# Patient Record
Sex: FEMALE | Employment: FULL TIME | URBAN - METROPOLITAN AREA
[De-identification: names, ages, dates, MRNs, and addresses within clinical notes are randomized per-mention and may not be internally consistent; named-entity substitution may affect disease eponyms.]

---

## 2024-01-07 ENCOUNTER — TRANSFERRED RECORDS (OUTPATIENT)
Dept: HEALTH INFORMATION MANAGEMENT | Facility: CLINIC | Age: 29
End: 2024-01-07

## 2024-01-28 ENCOUNTER — TRANSFERRED RECORDS (OUTPATIENT)
Dept: HEALTH INFORMATION MANAGEMENT | Facility: CLINIC | Age: 29
End: 2024-01-28

## 2024-09-10 ENCOUNTER — DOCUMENTATION ONLY (OUTPATIENT)
Dept: MIDWIFE SERVICES | Facility: CLINIC | Age: 29
End: 2024-09-10

## 2024-09-10 DIAGNOSIS — D24.2 BILATERAL FIBROADENOMAS OF BREASTS: ICD-10-CM

## 2024-09-10 DIAGNOSIS — D24.1 BILATERAL FIBROADENOMAS OF BREASTS: ICD-10-CM

## 2024-09-10 DIAGNOSIS — Z85.43 HISTORY OF OVARIAN CANCER: ICD-10-CM

## 2024-09-10 DIAGNOSIS — Z34.02 ENCOUNTER FOR SUPERVISION OF NORMAL FIRST PREGNANCY IN SECOND TRIMESTER: Primary | ICD-10-CM

## 2024-09-10 DIAGNOSIS — Z11.3 ROUTINE SCREENING FOR STI (SEXUALLY TRANSMITTED INFECTION): ICD-10-CM

## 2024-09-10 DIAGNOSIS — Z86.2 HISTORY OF ANEMIA: ICD-10-CM

## 2024-09-10 DIAGNOSIS — R63.6 UNDERWEIGHT: ICD-10-CM

## 2024-09-10 DIAGNOSIS — Z90.13 HISTORY OF BILATERAL MASTECTOMY: ICD-10-CM

## 2024-09-10 DIAGNOSIS — Z12.4 CERVICAL CANCER SCREENING: ICD-10-CM

## 2024-09-11 VITALS
DIASTOLIC BLOOD PRESSURE: 62 MMHG | WEIGHT: 108 LBS | BODY MASS INDEX: 18.44 KG/M2 | HEIGHT: 64 IN | SYSTOLIC BLOOD PRESSURE: 90 MMHG

## 2024-09-11 PROBLEM — C50.912 BILATERAL MALIGNANT NEOPLASM OF BREAST IN FEMALE (H): Status: ACTIVE | Noted: 2021-01-01

## 2024-09-11 PROBLEM — Z80.3 FAMILY HISTORY OF BREAST CANCER: Status: ACTIVE | Noted: 2020-10-12

## 2024-09-11 PROBLEM — D24.1 BILATERAL FIBROADENOMAS OF BREASTS: Status: ACTIVE | Noted: 2020-12-21

## 2024-09-11 PROBLEM — C50.911 BILATERAL MALIGNANT NEOPLASM OF BREAST IN FEMALE (H): Status: RESOLVED | Noted: 2021-01-01 | Resolved: 2024-09-11

## 2024-09-11 PROBLEM — C50.911 BILATERAL MALIGNANT NEOPLASM OF BREAST IN FEMALE (H): Status: ACTIVE | Noted: 2021-01-01

## 2024-09-11 PROBLEM — Z90.13 HISTORY OF BILATERAL MASTECTOMY: Status: ACTIVE | Noted: 2021-02-01

## 2024-09-11 PROBLEM — N80.9 ENDOMETRIOSIS: Status: ACTIVE | Noted: 2020-08-31

## 2024-09-11 PROBLEM — C50.912 BILATERAL MALIGNANT NEOPLASM OF BREAST IN FEMALE (H): Status: RESOLVED | Noted: 2021-01-01 | Resolved: 2024-09-11

## 2024-09-11 PROBLEM — R63.6 UNDERWEIGHT: Status: ACTIVE | Noted: 2024-09-11

## 2024-09-11 PROBLEM — D24.2 BILATERAL FIBROADENOMAS OF BREASTS: Status: ACTIVE | Noted: 2020-12-21

## 2024-09-11 RX ORDER — ALBUTEROL SULFATE 90 UG/1
2 AEROSOL, METERED RESPIRATORY (INHALATION)
COMMUNITY

## 2024-09-11 NOTE — PROGRESS NOTES
Radha Graves is a NOB at [facility] for PNC at 15w2d based on US dating due to discrepancy in LMP.  Hx of 25-31 day cycles per pt report.      Establishing care for pregnancy that was not planned but happy to have conceived.  FOB is Seth.      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  Prophylactic Bilateral Mastectomy 2021.  Multiple fibroadenoma cysts of breast and elevated risk of Br CA with hx and family hx of MGM and MGGM breast Ca age 27 and 31.  Maternal mother hx is unknown to Radha as raised by [facility].      Underwt at 108#  Has not returned to pre surgical wt of 130 since surgery.  BMI 17.1 today.      Works as .   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      Hx of anemia per pt.       ASSESSMENT: 15w2d with hx as noted.    PLAN: Referral to [facility] for US based on hx for imaging of R ovary with fetal survey.  Genetic testing per pt desire.        Antwan ZAPATA, LUIS      Radha Graves is a 29 year old single  who is not a previous CNM patient.  She presents for a new OB Visit.         Patient's last menstrual period was 06/06/2024 (within days)..  Estimated Date of Delivery: Mar 3, 2025.  Estimated Date of Delivery: Mar 3, 2025  discrepancy with Patient's last menstrual period was 06/06/2024 (within days).  She has not had bleeding since her LMP.  This was not a planned pregnancy.   FOB is Seth who is in good health.  FOB IS actively involved in relationship with Radha Graves and this pregnancy.        Current medications are:    Current Outpatient Medications:     albuterol (PROAIR HFA/PROVENTIL HFA/VENTOLIN HFA) 108 (90 Base) MCG/ACT inhaler, Inhale 2 puffs into the lungs., Disp: , Rfl:      =========================================    INFECTION HISTORY  HIV: no  Hepatitis B: no  Hepatitis C: no  Tuberculosis: no  Last PPD   Herpes self: no  Herpes  partner:  no  Chlamydia:  no  Gonorrhea:  no  HPV: no  BV:  no  Trichomoniasis:  no  Syphilis:  no  Chicken Pox:  yes  ============================================    PERSONAL/SOCIAL HISTORY  Lives with partner.  Exercise Habits:  aerobic 4-7 days per week.  Employment: Full time.  Her job involves moderate activity with moderate potential for toxic exposure.  Travel plans are none planned. Additional items: Has been given information regarding WIC  =============================================    REVIEW OF SYSTEMS  CONSTITUTIONAL:  She denies fever, chills and viral infections since her last LMP.  There is no fatigue.  Weigh loss has not occurred..  DIET: Appetite is increase.  Eats a Regular diet.    Recommend to gain >35 pounds with her pregnancy.  SKIN: Denies changes and suspicious moles or lesions.  HEAD: No headache since LMP  denies dizziness and fainting.  ENT: Denies blurred vision, hearing loss, tinnitus, frequent colds, epistaxis, hoarseness and tooth pain.    ENDOCRINE: Denies heat and cold intolerance, and polydipsia.    HEART/LUNGS: Denies dyspnea, wheezing, coughing and chest pain.  Has experienced racing heart beat at times but of short duration and resolved.    HEMATOLOGIC: Denies tendency to bruise and history of blood clots.  GASTROINTESTINAL: Denies heartburn, indigestion and change of color in stools.  She has not had nausea..  Constipation has notbeen a problem since LMP.  She denies hemorrhoids.  Denies rectal bleeding.   GENITOURINARY:  Denies urgency, dysuria and hematuria.  Has frequency of urination since LMP.  She does not experience stress incontinence.  MUSCULOSKELETAL: Denies joint stiffness, pain and restricted motion.  NEUROLOGIC:  Denies seizures, weakness and fainting.  PSYCHIATRIC:  Denies mood changes  Has no previous psychiatric history or mental health treatment.  ===========================================    PHYSICAL EXAM  GENERAL:  29 year old pleasant pregnant female,  alert, cooperative and well groomed.  SKIN:  Warm and dry, without lesions or tenderness.    HEAD: Symmetrical features.  EYES:  PERRLA, wears no corective lenses.  EARS: Tympanic membranes gray, translucent and intact.  NOSE: No flaring, patent  MOUTH:  Buccal mucosa pink, moist without lesions.  Teeth in good repair.    NECK:  Thyroid without enlargement and nodules.  Lymph nodes not palpable.   LUNGS:  Clear to auscultation.  HEART:  RRR without murmur.  ABDOMEN: Soft without masses or tenderness.  No CVA tenderness.  Uterus palpable.  No scars noted..  MUSCULOSKELETAL:  Full range of motion.   Pelvis proven to -pelvis not tested.  EXTREMITIES:  2+/2+ DTR, No edema. No significant varicosities.  ===================================================    PLAN:  GC/Chlamydia screening: future  NOB Labs as appropriate for patient future     consult for US for hx of ovarian Ca.  Pap is indicated.  Instructed on use of triage nurse line and contacting the on call CNM after hours in an emergency.      Genetic Screening testing reviewed:  Multiple Marker Screening (QUAD Test) :  which is performed between 15 and 20 wks and combines the patients age, and measurement of MSAFP, hCG, uE3 and Inhibin A from the maternal serum.  This screen detects 70% of Down Syndrome and 60% of Trisomy 18 infants with a 5% screen positive rate.   Patient will have QUAD screen drawn: No - .    Genetic Ultrasound which will be performed at 18-20 wks gestation will discover aneuploid markers in 60% of fetuses with Down Syndrome with 40% appearing normal by US.    Discussed the risks, benefits, side effects and alternative therapies for current prescribed and OTC medications.    Will return to the clinic in 4 weeks for her next routine prenatal check.  Will call to be seen sooner if problems arise.    Antwan ZAPATA, LUIS

## 2024-09-12 ENCOUNTER — TRANSCRIBE ORDERS (OUTPATIENT)
Dept: MATERNAL FETAL MEDICINE | Facility: HOSPITAL | Age: 29
End: 2024-09-12

## 2024-09-12 DIAGNOSIS — Z85.43 HISTORY OF OVARIAN CANCER: ICD-10-CM

## 2024-09-12 DIAGNOSIS — O26.90 PREGNANCY RELATED CONDITION, ANTEPARTUM: Primary | ICD-10-CM

## 2024-09-12 DIAGNOSIS — Z34.02 ENCOUNTER FOR SUPERVISION OF NORMAL FIRST PREGNANCY IN SECOND TRIMESTER: Primary | ICD-10-CM

## 2024-09-12 DIAGNOSIS — Z80.3 FAMILY HISTORY OF BREAST CANCER: ICD-10-CM

## 2024-09-30 ENCOUNTER — PRE VISIT (OUTPATIENT)
Dept: MATERNAL FETAL MEDICINE | Facility: HOSPITAL | Age: 29
End: 2024-09-30

## 2024-10-03 ENCOUNTER — ANCILLARY PROCEDURE (OUTPATIENT)
Dept: ULTRASOUND IMAGING | Facility: HOSPITAL | Age: 29
End: 2024-10-03
Attending: OBSTETRICS & GYNECOLOGY
Payer: COMMERCIAL

## 2024-10-03 ENCOUNTER — OFFICE VISIT (OUTPATIENT)
Dept: MATERNAL FETAL MEDICINE | Facility: HOSPITAL | Age: 29
End: 2024-10-03
Attending: OBSTETRICS & GYNECOLOGY
Payer: COMMERCIAL

## 2024-10-03 DIAGNOSIS — O26.90 PREGNANCY RELATED CONDITION, ANTEPARTUM: ICD-10-CM

## 2024-10-03 DIAGNOSIS — O43.112 CIRCUMVALLATE PLACENTA IN SECOND TRIMESTER: Primary | ICD-10-CM

## 2024-10-03 DIAGNOSIS — O26.92 PREGNANCY RELATED CONDITION IN SECOND TRIMESTER: ICD-10-CM

## 2024-10-03 DIAGNOSIS — Z31.5 ENCOUNTER FOR PROCREATIVE GENETIC COUNSELING AND TESTING: Primary | ICD-10-CM

## 2024-10-03 PROCEDURE — 76811 OB US DETAILED SNGL FETUS: CPT

## 2024-10-03 PROCEDURE — 99212 OFFICE O/P EST SF 10 MIN: CPT | Performed by: OBSTETRICS & GYNECOLOGY

## 2024-10-03 PROCEDURE — 99203 OFFICE O/P NEW LOW 30 MIN: CPT | Mod: 25 | Performed by: OBSTETRICS & GYNECOLOGY

## 2024-10-03 PROCEDURE — 76811 OB US DETAILED SNGL FETUS: CPT | Mod: 26 | Performed by: OBSTETRICS & GYNECOLOGY

## 2024-10-03 PROCEDURE — 96040 HC GENETIC COUNSELING, EACH 30 MINUTES: CPT | Performed by: GENETIC COUNSELOR, MS

## 2024-10-03 NOTE — PROGRESS NOTES
The patient was seen for an ultrasound in the Maternal-Fetal Medicine Center at the [facility] today.  For a detailed report of the ultrasound examination, please see the ultrasound report which can be found under the imaging tab.    If you have questions regarding today's evaluation or if we can be of further service, please contact the Maternal-Fetal Medicine Center.    Samantha Chambers MD  , OB/GYN  Maternal-Fetal Medicine  [phone] (Pager)

## 2024-10-03 NOTE — NURSING NOTE
Radha DUCKWORTH Star is a  at 18w3d who presents to [facility] for scheduled genetic counseling and L2.SBAR given to Dr. Chambers, see note in Epic.

## 2024-10-03 NOTE — PROGRESS NOTES
[facility]  Genetic Counseling Consult    Patient:  Radha Graves  Preferred Name: Radha YOB: 1995   Date of Service:  10/03/24   MRN: [id number]    Radha was seen at [facility] for genetic consultation. The indication for genetic counseling is desire to discuss options for genetic screening and diagnostics. The patient was unaccompanied to this visit, however her partner Seth was present via speakerphone for a majority of the appointment.     The session was conducted in English.      IMPRESSION/ PLAN   1. Radha has not had genetic screening in this pregnancy and declines options discussed today.    2. During today's [facility] visit, Radha had a genetic counseling session only. Screening and diagnostic testing was discussed and declined.     3. Radha had a level II comprehensive anatomy ultrasound today. Please see the ultrasound report for further details.      PREGNANCY HISTORY   /Parity:       Radha's pregnancy history is significant for:   Multiple miscarriages that had not been previously documented.  We discussed that 2 early losses would most likely be due to random/sporadic loss, however an evaluation for recurrent pregnancy loss would be indicated in the event of any additional pregnancy loss in the future.     CURRENT PREGNANCY   Current Age: 29 year old   Age at Delivery: 29 year old  DODIE: 3/3/2025, by Ultrasound                                   Gestational Age: 18w3d  This pregnancy is a single gestation.   This pregnancy was conceived spontaneously.  Radha reports the following complications and/or exposure concerns in this pregnancy: NONE    MEDICAL HISTORY   Radha s reported medical history includes a diagnosis of ovarian cancer at 15 treated with unilateral oophorectomy and bilateral fibrous adenoma in  treated with bilateral mastectomy.  Radha's family history  "is significant for early onset breast and ovarian cancer, and while Radha has had a negative hereditary cancer panel in the past, we discussed that further care with cancer-specific genetic counseling may be appropriate in the future.         FAMILY HISTORY   A three-generation pedigree was obtained today and is scanned under the \"Media\" tab in Epic. The family history was reported by Radha and their partner.    The following significant findings were reported today:   Radha had ovarian cancer at 15, and bilateral fibroadenemos in her 20's. Radha's mother had ovarianc cancer and breast cancer <50.  Her maternal grandmother had breast cancer <30.  We discussed the strong indication for a hereditary cancer syndrome in her family history. Radha's records indicate a negative 84 gene hereditary cancer panel several years ago, however we discussed that follow up with cancer-genetics in the coming years to assess for any new advances in testing would be appropriate.    Seth reports having G6PD deficiency. This condition affects how the body turns food into energy. It can cause hemolytic anemia. Jaundice is often the first symptoms of the condition. This condition can be variable and has environmental triggers that exacerbate the symptoms (such as josé beans). Some affected individuals only have mild symptoms and others may be asymptomatic. Follow up discussion with a pediatrician would be recommended for this history. Females are typically much less significantly affected than males.      Otherwise, the reported family history is unremarkable for multiple miscarriages, stillbirths, birth defects, intellectual disabilities, known genetic conditions, and consanguinity.       RISK ASSESSMENT FOR INHERITED CONDITIONS AND CARRIER SCREENING OPTIONS   Expanded carrier screening is available to screen for autosomal recessive conditions and X-linked conditions in a large list of genes. Carrier screening does not test " the pregnancy but gives a risk assessment for the pregnancy and future pregnancies to have the condition. Expanded carrier screening is designed to identify carrier status for conditions that are primarily childhood or adolescent onset. Expanded carrier screening does not evaluate for adult-onset conditions such as hereditary cancer syndromes, dementia/ Alzheimer's disease, or cardiovascular disease risk factors. Additionally, expanded carrier screening is not comprehensive for all known genetic diseases or inherited conditions. Carrier screening does not test for all genetic and health conditions or risk factors.     Autosomal recessive conditions happen when a mutation has been inherited from the egg and sperm and include conditions like cystic fibrosis, thalassemia, hearing loss, spinal muscular atrophy, and more. We reviewed that when both biological parents carry a harmful genetic change in a gene associated with autosomal recessive inheritance, each of their pregnancies has a 1 in 4 (25%) chance to be affected by that condition. X-linked conditions happen when a mutation has been inherited from the egg and include conditions like fragile X syndrome.With x-linked conditions, the specific risk generally depends on the chromosomal sex of the fetus, with XY individuals (generally male) being most severely affected.      screening was reviewed. About [place] Screening    The patient has not had carrier screening previously.     The patient declined the carrier screening options and declined a thorough discussion of the options. They are aware the option will remain, and they can contact us if they would like to pursue screening. See below for the more detailed information we dicussed.      RISK ASSESSMENT FOR CHROMOSOME CONDITIONS   We explained that the risk for fetal chromosome abnormalities increases with maternal age. We discussed specific features of common chromosome abnormalities, including Down  syndrome, trisomy 13, trisomy 18, and sex chromosome trisomies.    At age 29 at midtrimester, the risk to have a baby with Down syndrome is 1 in 855.  At age 29 at midtrimester, the risk to have a baby with any chromosome abnormality is 1 in 428.     Radha has not had genetic screening in this pregnancy and declines options discussed today.     GENETIC TESTING OPTIONS   Genetic testing during a pregnancy includes screening and diagnostic procedures.      Screening tests are non-invasive which means no risk to the pregnancy and includes ultrasounds and blood work. The benefits and limitations of screening were reviewed. Screening tests provide a risk assessment (chance) specific to the pregnancy for certain fetal chromosome abnormalities but cannot definitively diagnose or exclude a fetal chromosome abnormality. Follow-up genetic counseling and consideration of diagnostic testing is recommended with any abnormal screening result. Diagnostic testing during a pregnancy is more certain and can test for more conditions. However, the tests do have a risk of miscarriage that requires careful consideration. These tests can detect fetal chromosome abnormalities with greater than 99% certainty. Results can be compromised by maternal cell contamination or mosaicism and are limited by the resolution of current genetic testing technology.     There is no screening or diagnostic test that detects all forms of birth defects or intellectual disability.     We discussed the following screening options:     Non-invasive prenatal testing (NIPT)  Also called cell-free DNA screening because it detects chromosomes from the placenta in the pregnant person's blood  Can be done any time after 10 weeks gestation  Standard recommendation for NIPT screens for trisomy 21, trisomy 18, trisomy 13, with the option of adding sex chromosome aneuploidies, without or without predicted sex  Cannot screen for open neural tube defects, maternal serum  AFP after 15 weeks is recommended  New NIPT options include screening for other trisomies, microdeletion syndromes, and in some cases fetal blood antigens. Guidelines do not recommend these conditions are included in standard screening. These options have limitations and should be discussed with a genetic counselor.     We discussed the following ultrasound options:  Comprehensive level II ultrasound (Fetal Anatomy Ultrasound)  Ultrasound done between 18-20 weeks gestation  Screens for major birth defects and markers for aneuploidy (like trisomy 21 and trisomy 18)  Includes looking at the fetus/baby's growth, heart, organs (stomach, kidneys), placenta, and amniotic fluid    We discussed the following diagnostic options:     Amniocentesis  Invasive diagnostic procedure done after 15 weeks gestation  The procedure collects a small sample of amniotic fluid for the purpose of chromosomal testing and/or other genetic testing  Diagnostic result; more than 99% sensitivity for fetal chromosome abnormalities  Testing for AFP in the amniotic fluid can test for open neural tube defects    It was a pleasure to be involved with Radha s care. Face-to-face time of the meeting was 30 minutes.    Tuan Jennings, JUANJOSE, MS, [facility]  Board Certified and [place] Licensed Genetic Counselor   [facility]  Maternal Fetal Medicine  Office: [phone]  [facility]: [phone]   Fax: [phone]  [facility]

## 2024-10-06 ENCOUNTER — HEALTH MAINTENANCE LETTER (OUTPATIENT)
Age: 29
End: 2024-10-06

## 2024-10-07 LAB
ABO/RH(D): NORMAL
ANTIBODY SCREEN: NEGATIVE
SPECIMEN EXPIRATION DATE: NORMAL

## 2024-10-08 ENCOUNTER — TRANSFERRED RECORDS (OUTPATIENT)
Dept: HEALTH INFORMATION MANAGEMENT | Facility: CLINIC | Age: 29
End: 2024-10-08

## 2024-10-08 ENCOUNTER — DOCUMENTATION ONLY (OUTPATIENT)
Dept: MIDWIFE SERVICES | Facility: CLINIC | Age: 29
End: 2024-10-08
Payer: COMMERCIAL

## 2024-10-08 DIAGNOSIS — Z11.3 ROUTINE SCREENING FOR STI (SEXUALLY TRANSMITTED INFECTION): ICD-10-CM

## 2024-10-08 DIAGNOSIS — Z12.4 CERVICAL CANCER SCREENING: ICD-10-CM

## 2024-10-08 DIAGNOSIS — Z86.2 HISTORY OF ANEMIA: ICD-10-CM

## 2024-10-08 DIAGNOSIS — Z34.02 ENCOUNTER FOR SUPERVISION OF NORMAL FIRST PREGNANCY IN SECOND TRIMESTER: Primary | ICD-10-CM

## 2024-10-08 DIAGNOSIS — D50.8 IRON DEFICIENCY ANEMIA SECONDARY TO INADEQUATE DIETARY IRON INTAKE: ICD-10-CM

## 2024-10-08 LAB
ERYTHROCYTE [DISTWIDTH] IN BLOOD BY AUTOMATED COUNT: 15.9 % (ref 10–15)
EST. AVERAGE GLUCOSE BLD GHB EST-MCNC: 100 MG/DL
FERRITIN SERPL-MCNC: 21 NG/ML (ref 6–175)
HBA1C MFR BLD: 5.1 %
HCT VFR BLD AUTO: 33.2 % (ref 35–47)
HGB BLD-MCNC: 10.5 G/DL (ref 11.7–15.7)
IRON BINDING CAPACITY (ROCHE): 441 UG/DL (ref 240–430)
IRON SATN MFR SERPL: 10 % (ref 15–46)
IRON SERPL-MCNC: 42 UG/DL (ref 37–145)
MCH RBC QN AUTO: 26.7 PG (ref 26.5–33)
MCHC RBC AUTO-ENTMCNC: 31.6 G/DL (ref 31.5–36.5)
MCV RBC AUTO: 85 FL (ref 78–100)
PLATELET # BLD AUTO: 186 10E3/UL (ref 150–450)
RBC # BLD AUTO: 3.93 10E6/UL (ref 3.8–5.2)
RUBV IGG SERPL QL IA: 5.48 INDEX
RUBV IGG SERPL QL IA: POSITIVE
T PALLIDUM AB SER QL: NONREACTIVE
TSH SERPL DL<=0.005 MIU/L-ACNC: 1.04 UIU/ML (ref 0.3–4.2)
WBC # BLD AUTO: 9.1 10E3/UL (ref 4–11)

## 2024-10-08 PROCEDURE — 83550 IRON BINDING TEST: CPT | Performed by: ADVANCED PRACTICE MIDWIFE

## 2024-10-08 PROCEDURE — 2894A CBC WITH PLATELETS: CPT | Performed by: ADVANCED PRACTICE MIDWIFE

## 2024-10-08 PROCEDURE — 86780 TREPONEMA PALLIDUM: CPT | Performed by: ADVANCED PRACTICE MIDWIFE

## 2024-10-08 PROCEDURE — 2894A RUBELLA ANTIBODY IGG: CPT | Performed by: ADVANCED PRACTICE MIDWIFE

## 2024-10-08 PROCEDURE — 87591 N.GONORRHOEAE DNA AMP PROB: CPT | Performed by: ADVANCED PRACTICE MIDWIFE

## 2024-10-08 PROCEDURE — 87389 HIV-1 AG W/HIV-1&-2 AB AG IA: CPT | Performed by: ADVANCED PRACTICE MIDWIFE

## 2024-10-08 PROCEDURE — 84443 ASSAY THYROID STIM HORMONE: CPT | Performed by: ADVANCED PRACTICE MIDWIFE

## 2024-10-08 PROCEDURE — 82728 ASSAY OF FERRITIN: CPT | Performed by: ADVANCED PRACTICE MIDWIFE

## 2024-10-08 PROCEDURE — 87491 CHLMYD TRACH DNA AMP PROBE: CPT | Performed by: ADVANCED PRACTICE MIDWIFE

## 2024-10-08 PROCEDURE — 2894A TYPE AND SCREEN, ADULT: CPT | Performed by: ADVANCED PRACTICE MIDWIFE

## 2024-10-08 PROCEDURE — 83540 ASSAY OF IRON: CPT | Performed by: ADVANCED PRACTICE MIDWIFE

## 2024-10-08 PROCEDURE — 83036 HEMOGLOBIN GLYCOSYLATED A1C: CPT | Performed by: ADVANCED PRACTICE MIDWIFE

## 2024-10-08 PROCEDURE — 86803 HEPATITIS C AB TEST: CPT | Performed by: ADVANCED PRACTICE MIDWIFE

## 2024-10-08 PROCEDURE — G0145 SCR C/V CYTO,THINLAYER,RESCR: HCPCS | Performed by: ADVANCED PRACTICE MIDWIFE

## 2024-10-08 PROCEDURE — 2894A HEPATITIS B SURFACE ANTIGEN: CPT | Performed by: ADVANCED PRACTICE MIDWIFE

## 2024-10-08 NOTE — PROGRESS NOTES
Radha Graves is here at [facility] for Prenatal Care.  Documentation noted in media tab.  Antwan Swan APRN, CNM  [facility] Staff CNM

## 2024-10-09 LAB
C TRACH DNA SPEC QL NAA+PROBE: NEGATIVE
HBV SURFACE AG SERPL QL IA: NONREACTIVE
HCV AB SERPL QL IA: NONREACTIVE
HIV 1+2 AB+HIV1 P24 AG SERPL QL IA: NONREACTIVE
N GONORRHOEA DNA SPEC QL NAA+PROBE: NEGATIVE

## 2024-10-09 RX ORDER — MULTIVIT WITH MINERALS/LUTEIN
250 TABLET ORAL DAILY
Qty: 100 TABLET | Refills: 1 | Status: SHIPPED | OUTPATIENT
Start: 2024-10-09

## 2024-10-09 RX ORDER — FERROUS GLUCONATE 324(38)MG
324 TABLET ORAL
Qty: 100 TABLET | Refills: 3 | Status: SHIPPED | OUTPATIENT
Start: 2024-10-09

## 2024-10-09 RX ORDER — LANOLIN ALCOHOL/MO/W.PET/CERES
1000 CREAM (GRAM) TOPICAL DAILY
Qty: 100 TABLET | Refills: 1 | Status: SHIPPED | OUTPATIENT
Start: 2024-10-09

## 2024-10-11 LAB
BKR LAB AP GYN ADEQUACY: NORMAL
BKR LAB AP GYN INTERPRETATION: NORMAL
BKR LAB AP HPV REFLEX: NORMAL
BKR LAB AP LMP: NORMAL
BKR LAB AP PREVIOUS ABNORMAL: NORMAL
PATH REPORT.COMMENTS IMP SPEC: NORMAL
PATH REPORT.COMMENTS IMP SPEC: NORMAL
PATH REPORT.RELEVANT HX SPEC: NORMAL

## 2024-11-12 ENCOUNTER — DOCUMENTATION ONLY (OUTPATIENT)
Dept: MIDWIFE SERVICES | Facility: CLINIC | Age: 29
End: 2024-11-12
Payer: COMMERCIAL

## 2024-11-12 DIAGNOSIS — Z34.02 ENCOUNTER FOR SUPERVISION OF NORMAL FIRST PREGNANCY IN SECOND TRIMESTER: Primary | ICD-10-CM

## 2024-11-12 LAB
GLUCOSE 1H P 50 G GLC PO SERPL-MCNC: 125 MG/DL (ref 70–129)
HGB BLD-MCNC: 11.2 G/DL (ref 11.7–15.7)

## 2024-11-12 PROCEDURE — 82950 GLUCOSE TEST: CPT | Performed by: ADVANCED PRACTICE MIDWIFE

## 2024-11-12 PROCEDURE — 36415 COLL VENOUS BLD VENIPUNCTURE: CPT | Performed by: ADVANCED PRACTICE MIDWIFE

## 2024-11-12 NOTE — PROGRESS NOTES
Radha Graves is here at [facility] for PNC with documentation under the media tab for details of visit.  Antwan Swan APRN, CNM

## 2024-12-12 ENCOUNTER — OFFICE VISIT (OUTPATIENT)
Dept: MATERNAL FETAL MEDICINE | Facility: HOSPITAL | Age: 29
End: 2024-12-12
Attending: STUDENT IN AN ORGANIZED HEALTH CARE EDUCATION/TRAINING PROGRAM
Payer: COMMERCIAL

## 2024-12-12 ENCOUNTER — ANCILLARY PROCEDURE (OUTPATIENT)
Dept: ULTRASOUND IMAGING | Facility: HOSPITAL | Age: 29
End: 2024-12-12
Attending: STUDENT IN AN ORGANIZED HEALTH CARE EDUCATION/TRAINING PROGRAM
Payer: COMMERCIAL

## 2024-12-12 DIAGNOSIS — Z34.02 ENCOUNTER FOR SUPERVISION OF NORMAL FIRST PREGNANCY IN SECOND TRIMESTER: Primary | ICD-10-CM

## 2024-12-12 DIAGNOSIS — O43.112 CIRCUMVALLATE PLACENTA IN SECOND TRIMESTER: ICD-10-CM

## 2024-12-12 PROCEDURE — 99213 OFFICE O/P EST LOW 20 MIN: CPT | Mod: 25 | Performed by: STUDENT IN AN ORGANIZED HEALTH CARE EDUCATION/TRAINING PROGRAM

## 2024-12-12 PROCEDURE — 76816 OB US FOLLOW-UP PER FETUS: CPT

## 2024-12-12 PROCEDURE — 76816 OB US FOLLOW-UP PER FETUS: CPT | Mod: 26 | Performed by: STUDENT IN AN ORGANIZED HEALTH CARE EDUCATION/TRAINING PROGRAM

## 2024-12-12 NOTE — NURSING NOTE
Radha Graves is a  at 28w3d who presents to [facility] for scheduled growth ultrasound. Pt reports positive fetal movement. Pt denies bldg/lof/change in discharge, contractions, vision changes, chest pain/SOB or edema. Pt does endorse HEADACHES, which she is addressing with her primary clinic, likely related to her anemia. SBAR given to Dr. DARIAN Tate, see note in Epic.

## 2024-12-12 NOTE — PROGRESS NOTES
The patient was seen for an ultrasound in the [facility] today.  For a detailed report of the ultrasound examination, please see the ultrasound report which can be found under the imaging tab.     If you have questions regarding today's evaluation or if we can be of further service, please contact the Maternal-Fetal Medicine Center.    Chantal Tate MD  Maternal Fetal Medicine

## 2024-12-17 ENCOUNTER — DOCUMENTATION ONLY (OUTPATIENT)
Dept: MIDWIFE SERVICES | Facility: CLINIC | Age: 29
End: 2024-12-17
Payer: COMMERCIAL

## 2024-12-17 DIAGNOSIS — G43.001 MIGRAINE WITHOUT AURA AND WITH STATUS MIGRAINOSUS, NOT INTRACTABLE: Primary | ICD-10-CM

## 2024-12-17 DIAGNOSIS — K21.00 GASTROESOPHAGEAL REFLUX DISEASE WITH ESOPHAGITIS WITHOUT HEMORRHAGE: ICD-10-CM

## 2024-12-17 DIAGNOSIS — Z34.02 ENCOUNTER FOR SUPERVISION OF NORMAL FIRST PREGNANCY IN SECOND TRIMESTER: ICD-10-CM

## 2024-12-17 RX ORDER — MAGNESIUM OXIDE 400 MG/1
400 TABLET ORAL AT BEDTIME
Qty: 60 TABLET | Refills: 0 | Status: SHIPPED | OUTPATIENT
Start: 2024-12-17

## 2024-12-17 RX ORDER — METOCLOPRAMIDE 10 MG/1
10 TABLET ORAL 4 TIMES DAILY PRN
Qty: 30 TABLET | Refills: 0 | Status: SHIPPED | OUTPATIENT
Start: 2024-12-17

## 2024-12-17 RX ORDER — DIPHENHYDRAMINE HCL 25 MG
50 TABLET ORAL EVERY 6 HOURS PRN
Qty: 30 TABLET | Refills: 1 | Status: SHIPPED | OUTPATIENT
Start: 2024-12-17

## 2024-12-20 ENCOUNTER — TELEPHONE (OUTPATIENT)
Dept: MIDWIFE SERVICES | Facility: CLINIC | Age: 29
End: 2024-12-20
Payer: COMMERCIAL

## 2024-12-20 NOTE — TELEPHONE ENCOUNTER
[facility] Prior Authorization Team Request    Medication:  omeprazole (PRILOSEC) 20 MG DR capsule   Dosinmg daily  NDC (required for Medicaid members):   Insurance Company:     Primary Visit Coverage    Payer Plan Sponsor Code Group Number Group Name   MEDICA MEDICA VANTAGE PLUS SELF INSURED [id number]      Primary Visit Coverage Subscriber    ID Name [facility] Address   [id number]        CoverMyMeds Key (if applicable):   [id number]    Additional documentation:   Pharmacy Filling the Rx:    [facility]  [address] [place] [address]    Filling Pharmacy Phone:    [phone]   Contact:  PHARM [employer]IVERS[employer] PA (P [phone]) please send all responses to this pool.  Pharmacy NPI (required for Medicaid members):    Nazanin Lyons RN on 2024 at 2:12 PM

## 2024-12-23 NOTE — TELEPHONE ENCOUNTER
Retail Pharmacy Prior Authorization Team   Phone: [phone]    PRIOR AUTHORIZATION DENIED    Medication: OMEPRAZOLE 20 MG PO CPDR  Insurance Company: [employer] Non-Specialty PA's - Phone [phone] Fax [phone]  Denial Date: 12/23/2024  Denial Reason(s):     Drug is excluded for patient's ages 13 years and older. No PA or appeal available for review.         Appeal Information: N/A  Patient Notified: No

## 2025-01-23 ENCOUNTER — OFFICE VISIT (OUTPATIENT)
Dept: MATERNAL FETAL MEDICINE | Facility: HOSPITAL | Age: 30
End: 2025-01-23
Attending: STUDENT IN AN ORGANIZED HEALTH CARE EDUCATION/TRAINING PROGRAM
Payer: COMMERCIAL

## 2025-01-23 ENCOUNTER — ANCILLARY PROCEDURE (OUTPATIENT)
Dept: ULTRASOUND IMAGING | Facility: HOSPITAL | Age: 30
End: 2025-01-23
Attending: STUDENT IN AN ORGANIZED HEALTH CARE EDUCATION/TRAINING PROGRAM
Payer: COMMERCIAL

## 2025-01-23 DIAGNOSIS — O43.103 PLACENTAL ABNORMALITY IN THIRD TRIMESTER: Primary | ICD-10-CM

## 2025-01-23 DIAGNOSIS — O43.112 CIRCUMVALLATE PLACENTA IN SECOND TRIMESTER: ICD-10-CM

## 2025-01-23 PROCEDURE — 76816 OB US FOLLOW-UP PER FETUS: CPT | Mod: 26 | Performed by: STUDENT IN AN ORGANIZED HEALTH CARE EDUCATION/TRAINING PROGRAM

## 2025-01-23 PROCEDURE — 76816 OB US FOLLOW-UP PER FETUS: CPT

## 2025-01-23 NOTE — PROGRESS NOTES
Radha Graves is a  at 34w3d who presents to [facility] for follow up growth ultrasound. Pt reports positive fetal movement. Pt denies bldg/lof/change in discharge, contractions, headache, vision changes, chest pain/SOB or edema. SBAR given to Dr. Wright, see note in Epic.

## 2025-01-23 NOTE — PROGRESS NOTES
The patient was seen for an ultrasound in the Maternal-Fetal Medicine Center clinic today.  For a detailed report of the ultrasound examination, please see the ultrasound report which can be found under the imaging tab.    If you have questions regarding today's evaluation or if we can be of further service, please contact the Maternal-Fetal Medicine Center.    Marya Wright M.D.  Maternal Fetal-Medicine Specialist

## 2025-01-28 ENCOUNTER — DOCUMENTATION ONLY (OUTPATIENT)
Dept: MIDWIFE SERVICES | Facility: CLINIC | Age: 30
End: 2025-01-28
Payer: COMMERCIAL

## 2025-01-28 DIAGNOSIS — Z23 NEED FOR TDAP VACCINATION: ICD-10-CM

## 2025-01-28 DIAGNOSIS — Z34.03 ENCOUNTER FOR SUPERVISION OF NORMAL FIRST PREGNANCY IN THIRD TRIMESTER: Primary | ICD-10-CM

## 2025-01-28 PROCEDURE — 87653 STREP B DNA AMP PROBE: CPT | Performed by: ADVANCED PRACTICE MIDWIFE

## 2025-01-28 PROCEDURE — 90715 TDAP VACCINE 7 YRS/> IM: CPT | Performed by: ADVANCED PRACTICE MIDWIFE

## 2025-01-28 PROCEDURE — 90471 IMMUNIZATION ADMIN: CPT | Performed by: ADVANCED PRACTICE MIDWIFE

## 2025-01-28 NOTE — PROGRESS NOTES
Radha A Colon I 35w1d and had PNC at [facility] today.  Documentation in Media tab.  Antwan Swan APRN, CNM

## 2025-01-29 LAB — GP B STREP DNA SPEC QL NAA+PROBE: NEGATIVE

## 2025-02-07 ENCOUNTER — HOSPITAL ENCOUNTER (OUTPATIENT)
Facility: CLINIC | Age: 30
End: 2025-02-07
Admitting: ADVANCED PRACTICE MIDWIFE
Payer: COMMERCIAL

## 2025-02-07 ENCOUNTER — HOSPITAL ENCOUNTER (OUTPATIENT)
Facility: CLINIC | Age: 30
Discharge: HOME | End: 2025-02-07
Attending: OBSTETRICS & GYNECOLOGY | Admitting: ADVANCED PRACTICE MIDWIFE
Payer: COMMERCIAL

## 2025-02-07 VITALS — DIASTOLIC BLOOD PRESSURE: 63 MMHG | SYSTOLIC BLOOD PRESSURE: 97 MMHG

## 2025-02-07 DIAGNOSIS — Z36.89 ENCOUNTER FOR TRIAGE IN PREGNANT PATIENT: Primary | ICD-10-CM

## 2025-02-07 DIAGNOSIS — N76.0 BV (BACTERIAL VAGINOSIS): ICD-10-CM

## 2025-02-07 DIAGNOSIS — B96.89 BV (BACTERIAL VAGINOSIS): ICD-10-CM

## 2025-02-07 DIAGNOSIS — Z34.03 ENCOUNTER FOR SUPERVISION OF NORMAL FIRST PREGNANCY IN THIRD TRIMESTER: ICD-10-CM

## 2025-02-07 LAB
ALBUMIN UR-MCNC: NEGATIVE MG/DL
APPEARANCE UR: CLEAR
BILIRUB UR QL STRIP: NEGATIVE
CLUE CELLS: PRESENT
COLOR UR AUTO: ABNORMAL
GLUCOSE UR STRIP-MCNC: NEGATIVE MG/DL
HGB UR QL STRIP: NEGATIVE
KETONES UR STRIP-MCNC: NEGATIVE MG/DL
LEUKOCYTE ESTERASE UR QL STRIP: NEGATIVE
MUCOUS THREADS #/AREA URNS LPF: PRESENT /LPF
NITRATE UR QL: NEGATIVE
PH UR STRIP: 6.5 [PH] (ref 5–7)
RBC URINE: <1 /HPF
RUPTURE OF FETAL MEMBRANES BY ROM PLUS: NEGATIVE
SP GR UR STRIP: 1.01 (ref 1–1.03)
SQUAMOUS EPITHELIAL: <1 /HPF
TRICHOMONAS, WET PREP: ABNORMAL
UROBILINOGEN UR STRIP-MCNC: <2 MG/DL
WBC URINE: 1 /HPF
WBC'S/HIGH POWER FIELD, WET PREP: ABNORMAL
YEAST, WET PREP: ABNORMAL

## 2025-02-07 PROCEDURE — 87210 SMEAR WET MOUNT SALINE/INK: CPT | Performed by: ADVANCED PRACTICE MIDWIFE

## 2025-02-07 PROCEDURE — 81001 URINALYSIS AUTO W/SCOPE: CPT | Performed by: ADVANCED PRACTICE MIDWIFE

## 2025-02-07 PROCEDURE — G0463 HOSPITAL OUTPT CLINIC VISIT: HCPCS

## 2025-02-07 PROCEDURE — 99213 OFFICE O/P EST LOW 20 MIN: CPT | Performed by: ADVANCED PRACTICE MIDWIFE

## 2025-02-07 PROCEDURE — 84112 EVAL AMNIOTIC FLUID PROTEIN: CPT | Performed by: ADVANCED PRACTICE MIDWIFE

## 2025-02-07 RX ORDER — LIDOCAINE 40 MG/G
CREAM TOPICAL
Status: DISCONTINUED | OUTPATIENT
Start: 2025-02-07 | End: 2025-02-07 | Stop reason: HOSPADM

## 2025-02-07 RX ORDER — METRONIDAZOLE 500 MG/1
500 TABLET ORAL 2 TIMES DAILY
Qty: 14 TABLET | Refills: 0 | Status: SHIPPED | OUTPATIENT
Start: 2025-02-07 | End: 2025-02-14

## 2025-02-07 ASSESSMENT — ACTIVITIES OF DAILY LIVING (ADL)
ADLS_ACUITY_SCORE: 18
ADLS_ACUITY_SCORE: 18

## 2025-02-07 NOTE — DISCHARGE INSTRUCTIONS
EARLY LABOR DISCHARGE INSTRUCTIONS        Refer to Any Day Now handout for tips on how to labor at home    WHEN TO CALL YOUR PROVIDER:  If this is your first baby: Your contractions (tightening) are 5 minutes apart, last more than 1 minute, and have been consistently getting stronger for 1 hour or more  If this is your second baby or beyond: Your contractions are less than 10 minutes apart and have been consistently getting stronger for 1 hour or more  Feeling your baby move less than usual  Temperature of 100.4 F (38 C) or higher  New fluid leaking from your vagina  Other signs your provider asked you to look for in your body  Vaginal bleeding (bright red blood)  Swelling in your face or more swelling in your hands or legs  Headaches that don't get better after taking Tylenol (acetaminophen)  Changes in your vision (blurry or seeing spots or stars)  Nausea (sick to your stomach) and vomiting (throwing up)  Heartburn that doesn't go away  Sudden, bad belly pain that is unlike your contractions      FOLLOW UP:  As scheduled in the clinic

## 2025-02-07 NOTE — PROGRESS NOTES
Outpatient/Triage Note:    Patient Name:  Radha Graves  :      1995  MRN:      [id number]      Assessment:   @ 36w4d here for evaluation of LOF.   Bacterial vaginosis    Plan:   -Reviewed lab results. Rx sent for PO metronidazole.  - Discharge to home undelivered. Reviewed warning signs including decreased fetal movement, leaking of fluid, vaginal bleeding, or signs of labor. Reviewed how to contact on-call CNM. Follow-up in clinic with CNM as scheduled or sooner as needed. All questions answered. Agrees with plan.     Subjective:  Radha Graves is a 29 year old  at 36+4 weeks, with an EDC of 2025 who presented to [facility] for evaluation of possible LOF. She has been followed by Antwan Swan at [facility]; planning to birth at [place] though notes that [facility] was closer and instructed by triage RN to be evaluated, so came here instead today. Repots frequent alessandro welsh contractions over the last week. This morning noticed more moisture in her underwear. Continues to have more clear nonmalodorous discharge throughout the day. States that she has showered twice. Not needing a menstrual pad or liner for fluid. No intercourse within the last 24 hours. Denies any bloody show or painful contractions. Endorses normal fetal movement.     Review of prenatal record shows normal fist pregnancy with circumvallate placenta being followed by M. Medical history significant for oophorectomy at age 15 for ovarian cancer and bilateral mastectomy age 25.     Objective:  Vital signs: BP 97/63   LMP 2024 (Within Days)   FHR: 140 with moderate variability; accelerations present, decelerations absent  Uterine contractions: none    Physical Exam:   Abdomen: SIUP, vertex by Leopold's, abdomen non-tender  SVE: deferred; states that she was closed in clinic last week  Sterile speculum exam:  Legs: no edema    BP: (97)/(63) 97/63  No intake or output data in the 24 hours ending 25 1452    Results:  ROM+:  negative  UA:  Wet prep:     Provider:BENNY Gasca CNM    Total time spent on the date of this encounter doing: chart review, review of test results, patient visit, the physical exam & procedure, education, counseling, developing this plan of care, and documenting =  30 minutes

## 2025-02-07 NOTE — PROGRESS NOTES
Data: [facility]: 2025  1:04 PM.  Reason for maternal/fetal assessment is leaking vaginal fluid and pelvic pain. Patient reports starting the last couple days has had increased pelvic pain. Patient denies uterine contractions, vaginal bleeding, nausea, vomiting, headache, visual disturbances, epigastric or RUQ pain, significant edema. Patient reports fetal movement is normal. Patient is a 36w4d .  Prenatal record reviewed. Pregnancy has been uncomplicated.    Vital signs wnl. Support person is present.     Action: Verbal consent for EFM. Triage assessment completed.     Response: Patient verbalized agreement with plan. Will contact [facility] with update and further orders.

## 2025-02-07 NOTE — PROGRESS NOTES
Data: Patient assessed in the Birthplace for leaking vaginal fluid and pelvic pain. Cervical exam deferred. Membranes intact. Contractions are not present. See flowsheets for fetal assessment documentation.     Action: Presumed adequate fetal oxygenation documented. Discharge instructions reviewed. Patient instructed to report change in fetal movement, vaginal leaking of fluid or bleeding, abdominal pain, or any concerns related to the pregnancy to provider/clinic.  ROM+ negative, wet prep positive for clue cells, UA negative.    Response: Orders to discharge home per Jenny Isbell CNM. Script sent for metronidazole, instructions reviewed with patient. Patient verbalized understanding of education and agreement with plan. Discharged to home at 1535.

## 2025-02-11 ENCOUNTER — DOCUMENTATION ONLY (OUTPATIENT)
Dept: MIDWIFE SERVICES | Facility: CLINIC | Age: 30
End: 2025-02-11
Payer: COMMERCIAL

## 2025-02-11 DIAGNOSIS — Z34.03 ENCOUNTER FOR SUPERVISION OF NORMAL FIRST PREGNANCY IN THIRD TRIMESTER: Primary | ICD-10-CM

## 2025-02-11 NOTE — PROGRESS NOTES
Radha Graves is 37w1d today and receiving prenatal care at [facility].  Documentation under the media tab

## 2025-02-19 ENCOUNTER — PRENATAL OFFICE VISIT (OUTPATIENT)
Dept: MIDWIFE SERVICES | Facility: CLINIC | Age: 30
End: 2025-02-19
Payer: COMMERCIAL

## 2025-02-19 VITALS
HEART RATE: 90 BPM | OXYGEN SATURATION: 100 % | DIASTOLIC BLOOD PRESSURE: 72 MMHG | BODY MASS INDEX: 24.41 KG/M2 | WEIGHT: 142.2 LBS | SYSTOLIC BLOOD PRESSURE: 107 MMHG

## 2025-02-19 DIAGNOSIS — Z34.03 ENCOUNTER FOR SUPERVISION OF NORMAL FIRST PREGNANCY IN THIRD TRIMESTER: ICD-10-CM

## 2025-02-19 NOTE — PROGRESS NOTES
38w2d  Radha is here today for a routine prenatal visit. Denies vaginal bleeding, loss of fluid, or regular contractions. Reports fetal movement appropriate for gestational age.  Discussed birth plans - hoping for an unmedicated birth, but open to pain management as needed.  Reviewed signs of labor and when to notify provider. Reviewed third trimester warning signs and when/how to notify provider.  Return precautions provided. Return in 1 week or sooner as needed.

## 2025-02-25 ENCOUNTER — DOCUMENTATION ONLY (OUTPATIENT)
Dept: MIDWIFE SERVICES | Facility: CLINIC | Age: 30
End: 2025-02-25
Payer: COMMERCIAL

## 2025-02-25 DIAGNOSIS — O48.0 POST-TERM PREGNANCY, 40-42 WEEKS OF GESTATION: Primary | ICD-10-CM

## 2025-03-02 ENCOUNTER — HOSPITAL ENCOUNTER (OUTPATIENT)
Facility: CLINIC | Age: 30
Discharge: HOME | End: 2025-03-02
Attending: OBSTETRICS & GYNECOLOGY | Admitting: ADVANCED PRACTICE MIDWIFE
Payer: COMMERCIAL

## 2025-03-02 VITALS — HEIGHT: 64 IN | BODY MASS INDEX: 24.41 KG/M2 | RESPIRATION RATE: 16 BRPM | WEIGHT: 143 LBS | TEMPERATURE: 99 F

## 2025-03-02 DIAGNOSIS — Z34.03 ENCOUNTER FOR SUPERVISION OF NORMAL FIRST PREGNANCY IN THIRD TRIMESTER: ICD-10-CM

## 2025-03-02 PROCEDURE — 99417 PROLNG OP E/M EACH 15 MIN: CPT | Performed by: ADVANCED PRACTICE MIDWIFE

## 2025-03-02 PROCEDURE — 99215 OFFICE O/P EST HI 40 MIN: CPT | Performed by: ADVANCED PRACTICE MIDWIFE

## 2025-03-02 PROCEDURE — G0463 HOSPITAL OUTPT CLINIC VISIT: HCPCS

## 2025-03-02 RX ORDER — LIDOCAINE 40 MG/G
CREAM TOPICAL
Status: DISCONTINUED | OUTPATIENT
Start: 2025-03-02 | End: 2025-03-02 | Stop reason: HOSPADM

## 2025-03-02 ASSESSMENT — ACTIVITIES OF DAILY LIVING (ADL)
ADLS_ACUITY_SCORE: 15

## 2025-03-02 NOTE — PROGRESS NOTES
I was asked to see Radha Graves for further evaluation of reported allergies. Per chart, only allergies listed are PCN and oxycodone. However, Radha states she had an allergy panel performed when she was 10 in [place] and was told she could never have an epidural at that time. I asked if she knew what specifically she was allergic to in an epidural and she stated she does not know. She is also unable to obtain the records from the clinic in [place] as it is now closed.    Patient has received both general anesthetics and peripheral nerve blocks in the past. Per chart review, she has a history of ovarian cancer s/p left oophorectomy and prophylactic mastectomy with tissue expander and pectoralis block. Mastectomy was performed on 12/15/2020 which she was given standard induction medications (versed, propofol, fentanyl, lidocaine 1% and rocuronium). She also received a nerve block with 44 ml of of 0.25% bupivacaine. Patient was also given dilaudid for pain control. To her knowledge, she angel not recall any adverse reactions to the medications she received during her surgeries.    Given it is unclear what allergy she may have to an epidural (medication, preservative etc), I discussed it would be a risk vs benefit conversation to be had with the anesthesiologist and OBGYN physician if she does choose to have an epidural. At this point, she does not desire one. I also discussed there may need to be a conversation regarding neural axial anesthesia if a  delivery is required. It is reassuring she has had local and general anesthetics in the past, however it is unclear what allergy she may have to an epidural. Patient understands there will need to be a conversation between her and her care team regarding what will be the safest option for her if neural axial anesthesia is to be considered.

## 2025-03-02 NOTE — DISCHARGE SUMMARY
"Outpatient/Triage Note:    Patient Name:  Radha Graves  :      1995  MRN:      [id number]      Subjective:  Radha is doing well.  Mildly uncomfortable post membrane sweep.    Still stating interested in HAILEY to [facility] as intends to birth at [facility].       Objective:  Temp 99  F (37.2  C) (Temporal)   Resp 16   Ht 1.626 m (5' 4\")   Wt 64.9 kg (143 lb)   LMP 2024 (Within Days)   BMI 24.55 kg/m      Abdomen: SIUP, cephalic by Leopold's, abdomen non-tender  FHR: baseline 143, mod variability, accels present, no decels.  Uterine contractions: regular, mild contractions.  Radha is more crampy feeling since membrane sweep.  Cervical exam: exam deferred        Assessment/Plan:     @ 39w6d here for evaluation of decreased fetal movement:  Category I FHR, reactive NST  Normal fetal movement noted after arrival  Radha is planning to transfer care to the [facility] practice as she [facility] to [facility] closer to her home.   This writer spoke with Silvina Munguia CNM from the [facility].  Explained patient's intentions to birth at [facility].  Silvina will pass the information on to the midwives who typically see Radha in clinic so they can reach out to her PRN.  LUIS will contact the clinic schedulers for the [facility] practice to see if a transfer of care visit can be scheduled for Radha in the next few days.  Patient understands the above plan and will attend a HAILEY visit with midwives at [facility].  History of \"seizures\" or seizure-like episodes.  Uncertain official diagnosis, as hasn't had a formal neurology evaluation in the past (per patient report):  LUIS spoke with on-call Neurologist Dr Danette Gupta.  Plan established as follows:  Neurology consult visit will be scheduled.  Dr Gupta will contact her schedulers to see if an initial visit can be expedited, however neurologist " "acknowledges that initial evaluation visit will likely be when patient is postpartum due to her advanced gestational age).  Per Neurology, nothing special needs to be done while Radha is inpatient for her birth regarding her history of possible seizures.  Plan is for routine emergency care if a seizure would happen.    CNM also re-reviewed case with Dr Huber BECERRA a second time after phone call with Neurology.  At this time, we do not have a formal diagnosis for Radha's seizure-like history, so plan is for CNMs to continue management of patient (as long as this remains medically appropriate), AND also for CNMs to notify MARIAM of patient's history when she is admitted to hospital in labor.     History possible \"allergy to some component in an epidural\".  Unknown Medication name, and this information relayed to patient after an allergy \"scratch test\" in childhood:  Anesthesia visited with patient (see note from Dr Garcia 3/2/25).   Unknown drug name, and patient has previously demonstrated ability and safety to use local anesthetic (in dental visits) and general anesthesia (oophorectomy and bilateral mastectomy), and with patient various pain meds (post surgery pain management).  Plan to re-consult anesthesia during labor about history.  Per anesthesia will likely do a risk/benefit conversation at time of labor/birth if Radha is desiring an epidural for pain management (or for spinal if C-birth needed).    General:  Plan discharge to home undelivered.   Reviewed warning signs, including education about decreased or abnormal fetal movement patterns, leaking of fluid, vaginal bleeding, signs of labor, and other general warning signs.   All questions answered. Agrees with plan.           30 minutes on the date of the encounter doing chart review, review of outside records, review of test results, interpretation of tests, patient visit, documentation, discussion with other provider(s), and discussion with " family             Provider:  Kailey Patel, APRN/LUIS

## 2025-03-02 NOTE — PROGRESS NOTES
Data: Patient presented to Birthplace: 3/2/2025  9:20 AM.  Reason for maternal/fetal assessment is decreased fetal movement. Patient reports no fetal movement since 9pm last night. Patient denies leaking of vaginal fluid/rupture of membranes, vaginal bleeding, vomiting, significant edema. Patient reports fetal movement is decreased. Patient is a 39w6d .  Prenatal record reviewed. Pregnancy has been complicated by circumvallate placenta.    Vital signs wnl. Support person is present.     Action: Verbal consent for EFM. Triage assessment completed.     Response: Patient verbalized agreement with plan. Will contact Kailey Patel CNM with update and further orders.

## 2025-03-02 NOTE — PROGRESS NOTES
Data: Patient assessed in the Birthplace for decreased fetal movement. Cervix 1 cm dilated and 90% effaced. Fetal station 0. Membranes intact. Contractions are present. Contactions are  , 1-4 minutes apart, and last  seconds. Uterine assessment is mild by palpation during contractions and soft by palpation at rest. See flowsheets for fetal assessment documentation.     Action: Presumed adequate fetal oxygenation documented. Discharge instructions reviewed. Patient instructed to report change in fetal movement, vaginal leaking of fluid or bleeding, abdominal pain, or any concerns related to the pregnancy to provider/clinic.      Response: Orders to discharge home per Kailey Patel CNM. Patient verbalized understanding of education and agreement with plan. Discharged to home.  Miriam Joseph RN on 3/2/2025 at 12:57 PM

## 2025-03-02 NOTE — PROGRESS NOTES
"Outpatient/Triage Note:    Patient Name:  Radha Graves  :      1995  MRN:      [id number]      Subjective:  Radha is a 29 year old  who presents to [facility] for evaluation of decreased/changed fetal movement.      Reports that lately the movements have felt \"different\" than they did weeks ago.  In general, they are not as large or powerful, but starting last evening she felt that the movement was even less than what she's noted over the past few weeks.  Was concerned about her baby's well-being so came in to be evaluated.      Now that she has arrived at the hospital, she is noticing more fetal movement.    Radha is a patient of the [facility] at [facility] (being seen in [facility]).  She has arrived at [facility] for evaluation now as reports that she is planning on birthing at this location as it is closer to her home than [place] is.  Explained that while the [facility]wives at [facility] and [facility] are a part of the same larger system, that we are different practices, and that if she is planning to birth at [facility] we should do an official transfer of care to our practice here.  Asked if she has previously spoken with the [facility]wives about this desire, and she says that she just told them last week of her plans.  She doesn't remember them suggesting that she transfer care prior to showing up at [facility] when she is in labor.      Pertinent History reviewed today, requiring further evaluation:    History of seizures, or seizure-like occurrences:  States these occurrences started around age 15.  Had about 10-15 episodes in her lifetime, including one this pregnancy at 33 weeks gestation.  When the episodes happen, she is awake for them, but \"out of it\".  Able to communicate in short, or few-word sentences while the episodes happen, but nothing more than that.  Can feel them coming on and typically is able to gets into a safe and  " "supported position on her side to have them (has had 1-2 episodes where she didn't realize it was coming and fell down).  They last about 30 minutes. She always has tonic-clonic activity to right arm and leg during them, but not her whole body.  She has never lost bowel or bladder control.  She does sound to have a postictal period afterward where she reports feeling really weak and is very tired.  Typically, she sleeps the rest of the day after they happen.  Was told by health care providers who cared for her in her early teens (while living in [place]) that these episodes were \"seizures\".  She doesn't think she was ever evaluated by a neurologist.  Has never been hospitalized for the seizures.  Doesn't seek emergency care when they happen.  She doesn't think she's ever had an EEG for evaluation.  And, she hasn't ever taken medication to manage them.  Per chart review, Radha didn't mention this history during her initial pregnancy intake, or early pregnancy care.  There is no mention of this in her Epic \"History\" or on her \"Problem List\".  When asked if she mentioned this to the Midwives at [facility] in early pregnancy, she says she assumes she had told them.  The first mention of these episodes that I am able to see in her chart is from a pregnancy clinic note from when she was 33 weeks.  It can be found in the Media section in Epic - the note is incorrectly dated 1/7/24 (should be 1/7/25), and scanned into Media 1/13/25.  It details that Radha report of \"seizure\" the previous evening which she did not seek emergency care for (rather, she just reported it during a routine clinic visit).  It does not look like any Neurology follow up was ordered.    Radha reports that she has some sort of \"allergy\" that prohibits her from having an epidural or spinal anesthesia:    With further conversation, she states that she had a lot of environmental allergies at a child and saw an Allergist and got allergy shots for a " "number of years.  About age 10, she had a \"scratch test\" done which showed a reaction to \"some medication\" (doesn't know the name) which is associated with, or a component in, epidurals.  Was told by the Allergist that she \"is allergic and therefore should avoid epidurals\" or similar blocks through her lifetime.   She does report safely having Novocain for dental work in her lifetime, and does NOT have an allergy to this medication.   She has also had surgery several times (for ovarian cancer and double mastectomy and reconstruction) and has safely had opioid medications with NO allergy.   She does NOT have a history of ever having a regional block and having a reaction.  Rather, this report of an allergy is only related to a scratch test result in childhood.      Objective:  Temp 99  F (37.2  C) (Temporal)   Resp 16   Ht 1.626 m (5' 4\")   Wt 64.9 kg (143 lb)   LMP 2024 (Within Days)   BMI 24.55 kg/m      Abdomen: SIUP, cephalic by Leopold's, abdomen non-tender  FHR: baseline 145, mod variability, accels present (including prolonged accel during active fetal movement), no decels.  Uterine contractions: q 1-5 minutes, mild to palpation (patient not noticing them).  Cervical exam:  /  0 / very posterior / soft / cephalic.  Patient requested membrane sweep and this was done lightly.  Pink white discharge noted.  Legs: no edema  Other physical exam: Appears physically comfortable.  Not feeling contractions.        Assessment/Plan:   @ 39w6d here for evaluation of decreased fetal movement:  Category I FHR, reactive NST  Baby visibly seen moving in maternal uterus by RN and CNM  Radha is now reporting feeling normal fetal movement, and is reassured by fetal movement she is noting.  Radha is a patient of a different [facility] ([facility]), stating that she plans to deliver at [facility] as it is closer to her home.  Need to explore further with patient if " "she truly desires transfer of care away from a midwifery group she knows and is established with.  If truly desires to birth at [facility], is in need of transfer of care to this midwifery practice prenatally, or as a midwife-to-midwife transfer of care in labor if she does not established care with us prior to going into labor.  Will contact CNM group at [facility] and relay patient's intentions.  History of \"seizures\" or seizure-like episodes since age 15.  Reports no history of formal evaluation by neurology in her lifetime.  Uncertain official diagnosis.  LUIS spoke with [facility] Dr Ngo about patient's history.  She recommends consulting with Neurology to establish a further plan of care.    JAVIM paged [facility] on-call Neurologist.  Awaiting phone call back from Dr Danette Gupta.  History of being told by an allergist that the has an \"allergy to some component in an epidural\", and that she should therefore not have an epidural in the future.  Doesn't know the name of medication, and this allergy was determined through scratch test in childhood.  CNM will consult anesthesia about this history.    Anesthesia on their way up to talk with patient now.  Other History:  History Stage I Ovarian Cancer, diagnosed age 15.  S/P Right oophorectomy (and radiation per patient).  History of prophylactic Bilateral Mastectomy and reconstruction due to high risk self and family history (completed [id number])  Blood O positive, GBS negative, Rubella immune  Circumvallate placenta (growth scans 28 and 34 wks)  Pre-preg BMI 17.16 (weight 100 lbs).  43 lb weight gain so far.        80 minutes on the date of the encounter doing chart review, review of outside records, review of test results, interpretation of tests, patient visit, documentation, discussion with other provider(s), and discussion with family             Provider:  BENNY Wren/LUIS            "

## 2025-03-02 NOTE — DISCHARGE INSTRUCTIONS
Learning About When to Call Your Doctor During Pregnancy (After 20 Weeks)  Overview  It's common to have concerns about what might be a problem when you're pregnant. Most pregnancies don't have any serious problems. But it's still important to know when to call your doctor if you have certain symptoms or signs of labor.  These are general suggestions. Your doctor may give you some more information about when to call.  When to call your doctor (after 20 weeks)  Call 911  anytime you think you may need emergency care. For example, call if:  You have severe vaginal bleeding. This means you are soaking through a pad each hour for 2 or more hours.  You have sudden, severe pain in your belly.  You have chest pain, are short of breath, or cough up blood.  You passed out (lost consciousness).  You have a seizure.  You see or feel the umbilical cord.  You think you are about to deliver your baby and can't make it safely to the hospital or birthing center.  Call your doctor now or seek immediate medical care if:  You have vaginal bleeding.  You have belly pain.  You have a fever.  You are dizzy or lightheaded, or you feel like you may faint.  You have signs of a blood clot in your leg (called a deep vein thrombosis), such as:  Pain in the calf, back of the knee, thigh, or groin.  Swelling in your leg or groin.  A color change on the leg or groin. The skin may be reddish or purplish, depending on your usual skin color.  You have symptoms of preeclampsia, such as:  Sudden swelling of your face, hands, or feet.  New vision problems (such as dimness, blurring, or seeing spots).  A severe headache.  You have a sudden release of fluid from your vagina. (You think your water broke.)  You've been having regular contractions for an hour. This means that you've had at least 6 contractions within 1 hour, even after you change your position and drink fluids.  You notice that your baby has stopped moving or is moving less than  "normal.  You have signs of heart failure, such as:  New or increased shortness of breath.  New or worse swelling in your legs, ankles, or feet.  Sudden weight gain, such as more than 2 to 3 pounds in a day or 5 pounds in a week.  Feeling so tired or weak that you cannot do your usual activities.  You have symptoms of a urinary tract infection. These may include:  Pain or burning when you urinate.  A frequent need to urinate without being able to pass much urine.  Pain in the flank, which is just below the rib cage and above the waist on either side of the back.  Blood in your urine.  Watch closely for changes in your health, and be sure to contact your doctor if:  You have vaginal discharge that smells bad.  You feel sad, anxious, or hopeless for more than a few days.  You have skin changes, such as a rash, itching, or a yellow color to your skin.  You have other concerns about your pregnancy.  If you have labor signs at 37 weeks or more  If you have signs of labor at 37 weeks or more, your doctor may tell you to call when your labor becomes more active. Symptoms of active labor include:  Contractions that are regular.  Contractions that are less than 5 minutes apart.  Contractions that are hard to talk through.  Follow-up care is a key part of your treatment and safety. Be sure to make and go to all appointments, and call your doctor if you are having problems. It's also a good idea to know your test results and keep a list of the medicines you take.  Where can you learn more?  Go to https://www.[employer].net/patiented  Enter N531 in the search box to learn more about \"Learning About When to Call Your Doctor During Pregnancy (After 20 Weeks).\"  Current as of: April 30, 2024  Content Version: 14.3    2024 [facility] [employer].   Care instructions adapted under license by your healthcare professional. If you have questions about a medical condition or this instruction, always ask your healthcare professional. " [employer], [facility] disclaims any warranty or liability for your use of this information.

## 2025-03-03 ENCOUNTER — TELEPHONE (OUTPATIENT)
Dept: MIDWIFE SERVICES | Facility: CLINIC | Age: 30
End: 2025-03-03
Payer: COMMERCIAL

## 2025-03-03 PROBLEM — Z90.13 HISTORY OF BILATERAL MASTECTOMY: Status: ACTIVE | Noted: 2021-02-01

## 2025-03-03 PROBLEM — O36.8130 DECREASED FETAL MOVEMENTS IN THIRD TRIMESTER: Status: ACTIVE | Noted: 2025-03-03

## 2025-03-03 PROBLEM — R63.6 UNDERWEIGHT: Status: ACTIVE | Noted: 2024-09-11

## 2025-03-03 PROBLEM — Z80.3 FAMILY HISTORY OF BREAST CANCER: Status: ACTIVE | Noted: 2020-10-12

## 2025-03-03 PROBLEM — R56.9 SEIZURE-LIKE ACTIVITY (H): Status: ACTIVE | Noted: 2025-03-03

## 2025-03-03 NOTE — TELEPHONE ENCOUNTER
[facility]    Phone Message    May a detailed message be left on voicemail: yes     Reason for Call: Other: Pt returning a call to confirm appt for 3/5/25 at 2:30. Please call if needing to discuss anything further. Thanks       Action Taken: Other: Women's    Travel Screening: Not Applicable     Date of Service:

## 2025-03-05 ENCOUNTER — PRENATAL OFFICE VISIT (OUTPATIENT)
Dept: MIDWIFE SERVICES | Facility: CLINIC | Age: 30
End: 2025-03-05
Payer: COMMERCIAL

## 2025-03-05 VITALS
HEART RATE: 111 BPM | HEIGHT: 64 IN | WEIGHT: 143.7 LBS | BODY MASS INDEX: 24.53 KG/M2 | DIASTOLIC BLOOD PRESSURE: 60 MMHG | OXYGEN SATURATION: 97 % | SYSTOLIC BLOOD PRESSURE: 100 MMHG

## 2025-03-05 DIAGNOSIS — Z34.03 ENCOUNTER FOR SUPERVISION OF NORMAL FIRST PREGNANCY IN THIRD TRIMESTER: ICD-10-CM

## 2025-03-05 PROBLEM — O36.8130 DECREASED FETAL MOVEMENTS IN THIRD TRIMESTER: Status: RESOLVED | Noted: 2025-03-03 | Resolved: 2025-03-05

## 2025-03-05 PROBLEM — Z36.89 NST (NON-STRESS TEST) REACTIVE: Status: RESOLVED | Noted: 2025-02-07 | Resolved: 2025-03-05

## 2025-03-05 PROCEDURE — 0502F SUBSEQUENT PRENATAL CARE: CPT | Performed by: ADVANCED PRACTICE MIDWIFE

## 2025-03-05 PROCEDURE — 3074F SYST BP LT 130 MM HG: CPT | Performed by: ADVANCED PRACTICE MIDWIFE

## 2025-03-05 PROCEDURE — 99207 PR PRENATAL VISIT: CPT | Performed by: ADVANCED PRACTICE MIDWIFE

## 2025-03-05 PROCEDURE — 3078F DIAST BP <80 MM HG: CPT | Performed by: ADVANCED PRACTICE MIDWIFE

## 2025-03-05 NOTE — PROGRESS NOTES
[facility] visit:  [place] [facility] [place] desire [place] [facility].  Reviewed records. Pt has seen Kailey Patel and Jenny Isbell at the hospital as an outpatient.  Oriented to [facility] care.    Transfer of care Visit    Transfer of care from [facility]--records in Epic Media tab  Number of visits 7  Initial visit date 9/10/2024 @ 15weeks  Pre-preg wgt 108#    Care significant for h/o ovarian cancer with oophorectomy age 15  Prophylactic mastectomy due to family history of multiple fibroadenoma  Prepregnancy BMI 17.1  ? Seizure like occurrences, no medications, see BB note on 3/2/2025      Overall Radha has been feeling well.  Feeling more pelvic pressure and some contractions. Has had membranes swept x2--with some increase in contractions and some mucous discharge following that.  No LOF.  No vaginal bleeding.  Normal fetal movements.  Radha is hoping to avoid IOL if possible, but would like to schedule for 41 weeks.  Scheduled 3/10/2025 @ 0730 at [facility].  Reviewed labor precautions/WTC.  Reviewed on call CNM contact information.

## 2025-03-05 NOTE — PATIENT INSTRUCTIONS
"\"We hope you had a positive experience and that you can definitely recommend [facility] to your family and friends. You ll be receiving a survey soon and we look forward to hearing your feedback\".    [facility] - Contact information:  Appointment line and to get a hold of CNM in clinic Monday-Friday 8 am - 5 pm:  [phone].  There are some clinics with early start times (1st appointment 7:40 am) and others with evening hours (last appointment 6:20 pm).  Most are typically open from 8 am to 5 pm.    CNM on call answering service: [phone].  Specify your hospital of choice and leave a brief message for CNM;  will then page CNM who is on call at your specified hospital and you should receive a call back with 15 minutes.  Be sure that your ringer is audible and that you can accept blocked calls so that we can get back in touch with you! This number should be reserved for urgent needs if during the day, before 8 am, after 5 pm, weekends, holidays.    Contact the on-call CNM with warning signs, such as:  vaginal bleeding   Vaginal discharge and itching or pain and burning during urination  Leg/calf pain or swelling on one side  severe abdominal pain  nausea and vomiting more than 4-5 times a day, or if you are unable to keep anything down  fever more than 100.4 degrees F.     Semantics3hart  After each of your visits you are welcome to check Tributes.com for your visit summary including education and links to information relevant to your pregnancy and/or well woman care.   Find the \"Visits\" tab at the top of the page, you will see a list of recent visits and for each visit a for link for \"View After Visit Summary.\" View of your After Visit Summary will allow you to read our recommendations from your visit, review any education provided, and link to websites with useful information.   If you have any questions or difficulty navigating 121cast, please feel free to " "contact us and we will do our best to direct you.    Meet the Midwives from [facility]  You are invited to an informational meet and greet with [facility] Certified Nurse-Midwives. Our free \"Meet the Midwives\" event is a great opportunity to learn about our midwives' philosophy and experience, the hospitals where we can assist with your birth, and answer questions you may have. Partners, friends, and family are welcome to attend. Currently, this is a virtual event.  Dates: Last Thursday of every month at 7 pm.    Link to next (live) meeting  [email or web address]  To Join by Telephone (audio only) Call:   [phone] Phone Conference ID: [phone] #      Touring the Maternity Care Center  At this time we are offering a virtual tour of the Maternity Care Centers at both [facility] and [facility]:   [facility] Maternity Care:   https://[facility].org/locations/the-[facility]place-[facility]:   https://[facility].org/locations/[facility]    Scroll to the bottom of this hyperlink if the above link does not work      Childbirth and Parenting Education:     Everyday Miracles:   https://www.everyday-miracles.org/    Free Video Series from [facility]: https://nursing.[facility]/academics/specialty-areas/nurse-midwifery/having-baby-prenatal-videos/having-baby-prenatal-and    Childbirth Education virtual (live) classes: www.[employer]/classes  The Birth Hour: https://GroSocial/online-childbirth-class/  BirthED: https://www.birthedmn.com/  GANGA parenting center: http://ammapareAbazab.IXcellerate/   (015) 575-BABY  [place]: (education, yoga & wellness) www.[place].IXcellerate  Enlightened Mama: www.enlightenedmama.com   Childbirth collective: (Parent topic nights)  www.childbirthcollective.org/  Hypnobabies:  " www.SmartKickzbiestGleanster Researchcities.Micromuscle/  Hypnobirthing:  Http://hypnTransglobal Energy Resources.Micromuscle/  Hypnobirthing virtual class: www.Amedrix.Micromuscle/hypnobirthing    Information about doulas:  Childbirth collective: http://www.childbirthcollective.org/  Doulas of [place] (DEBBIE):  www.debbie.org  NAVITIME JAPAN  project: http://Immaculate BakingdoSilicon Mitusject.Micromuscle/     Early Childhood and Family Education (ECFE):  ECFE offers parents hands-on learning experiences that will nourish a lifetime of teachable moments.  http://ecfe.info/ecfe-home/    APPS and Podcasts:   Alexis Castle Nurture    Evidence Based Birth  The Birth Hour (for birth stories)   Birthful   Expectful   The Longest Shortest Time  PregnancyPodcast Sofiyabianca Gibson  https://www.downtobirthshow.com/    Book Recommendations:   Miri [place]'s Birthing From Within--first few chapters include a new-age tone, you may prefer to skip it and keep going, because there is good stuff later.  This book recommendation covers emotional preparation, but does cover coping with pain, and use of both pharmacological and nonpharmacological methods.    Guide to Childbirth by Kari Gonzalez  Childbirth Without Fear by Alissa Prajapati Read    Dr. Herrera' The Pregnancy Book and The Birth Book--the pregnancy book goes month-by month    The Birth Partner by Magalys Hernandez    Womanly Art of Breastfeeding by La Leche League International   Bestfeeding by Jasmyn Patel--great pictures    Mothering Your Nursing Toddler, by Katy Umana.   Addresses dealing with so many of the challenging behaviors of a nursing toddler.  How Weaning Happens, by La Leche League.  Discusses weaning at all ages, from medically necessary weaning of an infant, all the way up to age 5 (or older), with why/why not, and strategies.  Very empowering book both for deciding to wean and deciding not to.    American College of Nurse-Midwives (ACNM) http://www.midwife.org/; look at the informational handouts at  "http://www.midwife.org/Share-With-Women     www.mymidwife.org    Mother to Baby (Medication and Herbal guidance in pregnancy): http://www.mothertobaby.org  Toll-Free Hotline: 401.742.7267  LactMed (Medication use while breastfeeding): http://toxnet.nlm.nih.gov/newtoxnet/lactmed.htm    Women's Health.gov:  http://www.womenshealth.gov/a-z-topics/index.html    American pregnancy association - http://americanpregnancy.org    Centering Pregnancy (group prenatal care option): http://centeringhealthcare.org    March of Dimes www.Walldress.Tengah     FDA - Nutrition  www.mypyramid.gov  Under \"For Consumers,\" click on \"pregnant and breastfeeding women.\"      Centers for Disease Control and Prevention (CDC) - Vaccines : http://www.cdc.gov/vaccines/       When researching information on the web, question the validity of websites.  The [employer] .gov, .edu and.org tend to be more reliable information.  If there are a lot of advertisements, be cautious of the information provided. Stay away from blogs and chat rooms please!     Making Plans for Feeding My Baby    By this point, you probably have read a lot about feeding your baby.  Breastfeed or formula? Each parent's decision is their own and [facility] respects you and your choices. We ve gathered information on both breastfeeding and formula feeding to help with your decision. Talking with your nurse-midwife can also help in your decision.  However you plan to feed your baby, [facility] encourage rooming in with your baby, skin-to-skin contact and feeding your baby based on his or her cues.    Skin-to-skin contact  Being close to mom helps your baby adjust to life outside of the womb.  It helps your baby regulate their body temperature, heart rate, and breathing.  Your baby will usually be placed skin-to-skin immediately following birth or as soon as possible, if medical intervention is needed.    Rooming-In  Having " your baby stay with you in your room is called  rooming-in .  Keeping your baby in your room helps you to learn how to care for your baby by getting to know your baby s cues, body rhythms and sleep cycle.       Cue-based feeding  Cues (signals) are baby s way of telling you what he or she wants.  When you learn your infant s cues, you know how to care for and feed your baby.   Feeding cues are the licking and smacking of lips, bringing their fist to their mouth, and a reflex called  rooting - where baby turns and opens his or her mouth, searching for the breast or bottle.  Crying is a late feeding cue.  Babies can feed frequently, often at least 8 times in 24 hours.    Breastfeeding facts  Breast milk is the best source of nutrition for your baby and is available at birth. In the first couple of days, your milk volume is already starting to increase, though it may not be noticeable. Breastfeed frequently to increase your milk supply. Within three to five days, you will begin to notice larger milk volumes. An increase in breast size, heaviness and firmness are often described as the milk  coming in.  Frequent breastfeeding can help breasts from getting overly firm and painful. You will know the baby is getting enough milk if your baby is having wet and dirty diapers and gaining weight.     If your goal is to exclusively breastfeed, it is important to not use any formula or artificial nipples (including bottles and pacifiers) while your baby is learning to breastfeed.  While it may seem like an  easy  option to give your baby a bottle, formula should only be given if there is a medical reason for your baby to have it.      Positioning and attachment   Get comfortable.  Use pillows as needed to support your arms and baby.  Hold baby close at the level of your breast, facing you in a tummy to tummy position.  Skin to skin helps with this.  Position the baby with his or her nose by the nipple.  There should be a straight  line from baby s ear to shoulder to hips.  Tickle your baby s lips or wait for baby to open mouth wide, bring baby to breast by leading with the chin.  Aim the nipple at the roof of baby s mouth.  A rapid sucking pattern is followed by longer, drawing pattern with occasional swallows heard.  When baby is correctly latched, your nipple and much of the areola are pulled well into baby s mouth.      Returning to work or school  Focus on a good start to breastfeeding.  Many women continue to provide breastmilk for their baby when they return to work or school.  Making plans about where to pump and store milk can make the transition go well.  Talk with other mothers who have also returned to work or school for tips and support.  Your employer s Human Resource department may be a resource as well.     Returning to work or school: (continued)   babies can mean fewer  sick  days for you.  A quality breast pump will also save time and add comfort.  Check with your insurance prior to giving birth for breast pump coverage.  Many insurance companies include a pump within your benefits.  Wait until your baby is at least three weeks old to introduce a bottle for the first time.  Have someone besides you give the bottle.  Breastfeed when you are with your baby. Reserve your bottles of breast milk for when you are away.   Your breasts will need to be  emptied  either by your baby or a pump.  Plan to pump at least twice in an eight hour day.  If you cannot pump at work, continue breastfeeding at home. Any amount of breast milk is worth giving to your baby.    Formula feeding facts  If you are planning to use formula to feed your baby, you will want to make some preparations ahead of time. Talk to your doctor or nurse-midwife about what type of formula to use. Some are iron-fortified, meaning they have extra iron in them. You will want to purchase formula and bottles before your baby is born to be sure you are ready after  you return from the hospital. [facility] do not provide formula samples to take home.    Be sure to follow formula mixing directions closely. Regular milk in the dairy case at the grocery store should not be given to babies under 1 year old. Baby formula is sold in several forms including:  Ready-to-use. This is the most expensive, but no mixing is necessary.  Concentrated liquid. This is less expensive than ready-to-use and you mix with water.  Powder. This is the least expensive. You mix one level scoop of powdered formula with two ounces of water and stir well.    Most babies need 2.5 ounces of formula per pound of body weight each day. This means an 8-pound baby may drink about 20 ounces of formula a day; however, this is just an estimate. The most important thing is to pay attention to your baby s cues.  If your baby is always fussy, needs more iron or has certain food allergies, your physician may suggest you change your baby s formula to a different kind.     How do I warm my baby s bottles?  You may feed your baby a bottle without warming it first. It is OK for the breast milk or formula to be cool or room temperature. If your baby seems to prefer it warmed, you can put the filled bottle in a container of warm water and let it stand for a few minutes. Check the temperature of the liquid on your skin before feeding it to your baby; to be sure it isn t too hot. Do not heat bottles in the microwave. Microwaves heat food and liquids unevenly, and this can cause hot spots that can burn your baby.    How do I clean and sterilize bottles?  Sterilize bottles and nipples before you use them for the first time. You can do this by putting them in boiling water for 5 minutes. After that first time, you can wash them in hot and soapy water. Rinse them carefully to be sure there is no soap left on them. You can also wash them in the .    Breastfeeding/Chestfeeding Support  Contact  us  Breastfeeding/chestfeeding is the natural and healthy way for parents to feed their babies and provides the best source of nutrition in most cases to infants. Breast milk has health benefits for mom, too. The American Academy of Pediatrics recommends exclusively breastfeeding for 6 months for optimal growth and development and breastfeeding up to at least a year.  Benefits to baby:  Easy digestion, less diarrhea and constipation, breast milk is easy to digest.  Lots of bonding with parent.  Less likely to have asthma.  Less ear infections and respiratory infections.  Less likely to have Type 2 Diabetes.  Less likely to become obese.  Lower risk of Sudden Infant Death Syndrome (SIDS).  Benefits to breastfeeding/chestfeeding parent:  Helps with weight loss.  Lower risk of ovarian and breast cancer, Type 2 diabetes and heart disease.  /chestfed babies are easy to take on trips. Just grab the diapers and go!  Breastfeeding/chestfeeding saves money (no formula or bottle costs, fewer doctor bills and medication costs).  Ready to breastfeed/chestfeed anywhere, don t need to make and wash bottles.  Breastfeeding/chestfeeding is wonderful, but not always easy. Learning what to expect ahead of time and get support from a lactation professional. Check out the [facility] Breastfeeding Resource Guide for classes, drop in support groups, childbirth education, Doulas and clinic and hospital lactation services.     Early Childhood Family Education [place] [address] provides a free, drop-in class/breastfeeding support group, facilitated by a lactation consultant and .  During the group you can connect with other parents, weigh your baby, ask questions about feeding and baby development, and more.  You do not need to register.  Fall in-person meetings will begin on September 12, are for parents of babies from birth to 9 months, and will meet on Monday evenings from 6 - 7:15 pm in [facility]  "[address], which is [place] [id number] [address] also offers virtual group meetings with a lactation consultant/.  These take place on , from 11:30 am - 12:30 pm for parents of newborns to 3-month-olds, and from 4:15 - 5:15 for parents of 3 - 9 - month olds.  To get the meeting link contact Yessenia Ayala at [phone].    [facility] offers a free, drop-in breastfeeding support group facilitated by CAITLYN Reilly.   at [facility] [address], [address], [place].  A scale is available to check baby weights, if desired.  [facility] also has a variety of new parent classes:  (cost for registration)  https://MinusNine Technologies/classes/    [facility] Lactation Lounge facilitated by CAITLYN Olivares:  Free, drop-in support group for breastfeeding, with baby scale available if needed.  Meets at [facility], second Tuesday of each month, 10 am - 12 pm.  Text [phone] for info.    [place] Support Group,  at 10:30 am   Run by CAITLYN Castanon of The Baby Whisperer Lactation Consultants   Go to The Baby Whisperer Lactation Consultants Facebook page, click on \"events\" for link:   Https://www.GoGold Resources.com/events/200470450497593/    [facility] [place] breastfeeding support community:  free, drop-in breastfeeding support facilitated by Certified Lactation Counselors, open  and  from 1 pm - 5 pm.  In [place]:  [facility] [place], [facility]:   guidingstar[place].org    [facility] Milk Hour,  at 2:30 pm    Run by CAITLYN Simpson  Go to [facility] FB page and send message to get link   Https://www.GoGold Resources.com/healthfoundations/    Sharif Kay:  http://www.[facility].org/    [place] offers a Lactation Lounge every Friday 12pm - 1pm, run by Sharif Linares Leader.  Sign up via link at " [employer]/cbe-lactation   https://www.[employer]/cbe-lactation    [facility] is offering virtual support groups every Monday, 10:30 am - 12 pm, run by RN IBCLC: Https://www.facebook.com/events/047268982605738/    Culturally-Specific Breastfeeding Support:     For Hmong Families:   The Hmong Breastfeeding Coalition is a wonderful support for [place] Hmong women who are breastfeeding.  They are best found on Facebook.    for Black families:    Inform Genomicscolate Milk Club:  http://www.Bandsintown Group.Couchsurfing/chocolate-milk-club/  Email: Sarabjit@[employer]    R.O.S.E. = Reaching our Sisters Everywhere  Http://www.breastfeedingrose.org/    Club Mom breastfeeding support for Black mothers:  Contact Cooper Damon  Phone: [phone]   Email:  [email or web address].     Nelli Valadez  Phone: [phone]   Email:  [email or web address]    Club Dad parent support for Black fathers:   Contact Jose Walker   Phone: [phone]   Email:  [email or web address]    For Native/Indigenous Families:    https://www.Conversocial.com/groups/nitblaynesing.gimashkikinaan     Additional Resources:  La Leche League is an international, nonprofit, nonsectarian organization offering information, education, and support to mothers who want to breast-feed their babies. Local groups offer phone help and monthly meetings. Visit [employer].org or MarginPoint.org and us the  Find local support  drop down menu or click on the  Resources  tab.    [place] Breastfeeding Resources: 9-722-998-BABY (2229) toll free    National Breastfeeding Help Line trained breastfeeding peer counselors can help answer common breast-feeding questions by phone. Monday-Friday: English/Malaysian  [phone]- [phone] toll free, [phone] (TTY)    Virtual Breastfeeding Support:    During this time of isolation, breastfeeding families need even more community!  Here are some area organizations offering virtual support groups  "for breastfeeding:    [place] [facility] Support Group,  at 10:30 am   Run by CAITLYN Castanon of The Baby Whisperer Lactation Consultants   Go to The Baby Whisperer Lactation Consultants Facebook page and click on \"events\" for link   https://www.Paper Battery Company.com/events/030719085119252/  [facility] Milk Hour,  at 2:30 pm    Run by CAITLYN Simpson   Go to [facility] FB page and send message to get link   https://www.[employer] Company.Alitalia/Nines Photovoltaicfoundations/  [facility]/[place] holding virtual meetings the first Tuesday of each month, 8-9 pm, and the   Third Saturday, 10 - 11 am.  Go to [facility] and [place] FB page; message to get link https://www.mnlakeplace.com/LLLofGoldenVik/?hc_location=[facility]  [place] offers a Lactation Lounge every Friday 12pm - 1pm, run by Sharon Allison [facility] Leader   Sign up via link at https://www.Introhive/cbe-lactation  [facility] is offering virtual support groups every Monday, 10:30 am - 12 pm, run by nurse IBCLC   Https://www.Paper Battery Company.com/events/927889971325980/    Prenatal Breastfeeding Classes:      [employer] is offering virtual breastfeeding and  care classes:  https://www.Introhive/education-workshops  BirthEd childbirth and breastfeeding education offering virtual prenatal breastfeeding classes  https://www.BrakeQuotes.comedmn.com/workshops    Preparing for your baby:     [place] Screening Program  Http://www.health.[facility].mn.us/newbornscreening/  [place] newborns are tested soon after birth for more than 50 hidden, rare disorders, including hearing loss and critical congenital heart disease (CCHD). This site provides resources and information for families and providers.    When to call:   Appointment line and to get a hold of CNM in clinic Monday-Friday 8 am - 5 pm:  [phone].  There are some clinics with early start times (1st appointment 7:40 " am) and others with evening hours (last appointment 6:20 pm).  Most are typically open from 8 am to 5 pm.    CNM on call answering service: [phone].  Specify your hospital of choice and leave a brief message for CNM;  will then page CNM who is on call at your specified hospital and you should receive a call back with 15 minutes.  Be sure that your ringer is audible and that you can accept blocked calls so that we can get back in touch with you! This number should be reserved for urgent needs if during the day, before 8 am, after 5 pm, weekends, holidays.    Contact the on-call CNM with warning signs, such as:  vaginal bleeding   Vaginal discharge and itching or pain and burning during urination  Leg/calf pain or swelling on one side  severe abdominal pain  nausea and vomiting more than 4-5 times a day, or if you are unable to keep anything down  fever more than 100.4 degrees F.     Make plans for transportation and  as needed for when you are going to the hospital.    Ask your health care provider about vaccinations you may need following delivery. By now, you should have received a Tdap immunization to protect against pertussis or whooping cough. Fathers and family members who will be in close contact with the baby should also receive a Tdap shot at least two weeks before the expected birth of the baby if they have not had a Td (tetanus) shot for at least two years.    Tell your midwife or physician how you plan to feed your baby (breast or bottle), who you have chosen to do pediatric care for your baby, and if you have a boy, whether you have chosen to have him circumcised. You will need a car seat correctly installed in your vehicle to bring your baby home. As you start to set up the nursery at home for your baby, make sure the crib is safe. The mattress needs to fit snugly against the edges of the crib. If you can fit a soda can between the bars, they are too far apart and can allow the  baby's head to caught between them.    Learn about infant care and feeding, including information about infant CPR. We recommend that you put your baby to sleep on his or her back to reduce the chance of Sudden Infant Death Syndrome (SIDS). To maintain a healthy environment in which your child can grow, it's best to keep your home smoke-free. By preparing ahead, your transition into parenthood will go smoothly for you and your baby.    Your midwife will want to see you for a checkup 2 to 6 weeks after delivery.

## 2025-03-08 ENCOUNTER — HOSPITAL ENCOUNTER (INPATIENT)
Facility: CLINIC | Age: 30
LOS: 3 days | Discharge: HOME | End: 2025-03-11
Attending: ADVANCED PRACTICE MIDWIFE | Admitting: ADVANCED PRACTICE MIDWIFE
Payer: COMMERCIAL

## 2025-03-08 PROBLEM — Z36.89 ENCOUNTER FOR TRIAGE IN PREGNANT PATIENT: Status: ACTIVE | Noted: 2025-03-08

## 2025-03-08 PROBLEM — Z37.9 NORMAL LABOR: Status: ACTIVE | Noted: 2025-03-08

## 2025-03-08 LAB
ABO + RH BLD: NORMAL
BLD GP AB SCN SERPL QL: NEGATIVE
SPECIMEN EXP DATE BLD: NORMAL

## 2025-03-08 PROCEDURE — 120N000001 HC R&B MED SURG/OB

## 2025-03-08 PROCEDURE — 36415 COLL VENOUS BLD VENIPUNCTURE: CPT | Performed by: ADVANCED PRACTICE MIDWIFE

## 2025-03-08 PROCEDURE — 86780 TREPONEMA PALLIDUM: CPT | Performed by: ADVANCED PRACTICE MIDWIFE

## 2025-03-08 PROCEDURE — 86850 RBC ANTIBODY SCREEN: CPT | Performed by: ADVANCED PRACTICE MIDWIFE

## 2025-03-08 PROCEDURE — 86900 BLOOD TYPING SEROLOGIC ABO: CPT | Performed by: ADVANCED PRACTICE MIDWIFE

## 2025-03-08 PROCEDURE — 85027 COMPLETE CBC AUTOMATED: CPT | Performed by: ADVANCED PRACTICE MIDWIFE

## 2025-03-08 RX ORDER — TRANEXAMIC ACID 10 MG/ML
1 INJECTION, SOLUTION INTRAVENOUS EVERY 30 MIN PRN
Status: DISCONTINUED | OUTPATIENT
Start: 2025-03-08 | End: 2025-03-09 | Stop reason: HOSPADM

## 2025-03-08 RX ORDER — ONDANSETRON 4 MG/1
4 TABLET, ORALLY DISINTEGRATING ORAL EVERY 6 HOURS PRN
Status: DISCONTINUED | OUTPATIENT
Start: 2025-03-08 | End: 2025-03-09 | Stop reason: HOSPADM

## 2025-03-08 RX ORDER — OXYTOCIN 10 [USP'U]/ML
10 INJECTION, SOLUTION INTRAMUSCULAR; INTRAVENOUS
Status: DISCONTINUED | OUTPATIENT
Start: 2025-03-08 | End: 2025-03-09 | Stop reason: HOSPADM

## 2025-03-08 RX ORDER — OXYTOCIN 10 [USP'U]/ML
10 INJECTION, SOLUTION INTRAMUSCULAR; INTRAVENOUS
Status: DISCONTINUED | OUTPATIENT
Start: 2025-03-08 | End: 2025-03-11 | Stop reason: HOSPADM

## 2025-03-08 RX ORDER — PROCHLORPERAZINE MALEATE 10 MG
10 TABLET ORAL EVERY 6 HOURS PRN
Status: DISCONTINUED | OUTPATIENT
Start: 2025-03-08 | End: 2025-03-09 | Stop reason: HOSPADM

## 2025-03-08 RX ORDER — LIDOCAINE 40 MG/G
CREAM TOPICAL
Status: DISCONTINUED | OUTPATIENT
Start: 2025-03-08 | End: 2025-03-08 | Stop reason: HOSPADM

## 2025-03-08 RX ORDER — KETOROLAC TROMETHAMINE 15 MG/ML
15 INJECTION, SOLUTION INTRAMUSCULAR; INTRAVENOUS
Status: DISCONTINUED | OUTPATIENT
Start: 2025-03-08 | End: 2025-03-09 | Stop reason: HOSPADM

## 2025-03-08 RX ORDER — SODIUM CHLORIDE, SODIUM LACTATE, POTASSIUM CHLORIDE, CALCIUM CHLORIDE 600; 310; 30; 20 MG/100ML; MG/100ML; MG/100ML; MG/100ML
INJECTION, SOLUTION INTRAVENOUS CONTINUOUS
Status: DISCONTINUED | OUTPATIENT
Start: 2025-03-09 | End: 2025-03-09 | Stop reason: HOSPADM

## 2025-03-08 RX ORDER — NALOXONE HYDROCHLORIDE 0.4 MG/ML
0.2 INJECTION, SOLUTION INTRAMUSCULAR; INTRAVENOUS; SUBCUTANEOUS
Status: DISCONTINUED | OUTPATIENT
Start: 2025-03-08 | End: 2025-03-11 | Stop reason: HOSPADM

## 2025-03-08 RX ORDER — NALOXONE HYDROCHLORIDE 0.4 MG/ML
0.4 INJECTION, SOLUTION INTRAMUSCULAR; INTRAVENOUS; SUBCUTANEOUS
Status: DISCONTINUED | OUTPATIENT
Start: 2025-03-08 | End: 2025-03-11 | Stop reason: HOSPADM

## 2025-03-08 RX ORDER — MISOPROSTOL 200 UG/1
800 TABLET ORAL
Status: DISCONTINUED | OUTPATIENT
Start: 2025-03-08 | End: 2025-03-09 | Stop reason: HOSPADM

## 2025-03-08 RX ORDER — ACETAMINOPHEN 325 MG/1
650 TABLET ORAL EVERY 4 HOURS PRN
Status: DISCONTINUED | OUTPATIENT
Start: 2025-03-08 | End: 2025-03-09 | Stop reason: HOSPADM

## 2025-03-08 RX ORDER — CARBOPROST TROMETHAMINE 250 UG/ML
250 INJECTION, SOLUTION INTRAMUSCULAR
Status: DISCONTINUED | OUTPATIENT
Start: 2025-03-08 | End: 2025-03-09 | Stop reason: HOSPADM

## 2025-03-08 RX ORDER — OXYTOCIN/0.9 % SODIUM CHLORIDE 30/500 ML
340 PLASTIC BAG, INJECTION (ML) INTRAVENOUS CONTINUOUS PRN
Status: DISCONTINUED | OUTPATIENT
Start: 2025-03-08 | End: 2025-03-09 | Stop reason: HOSPADM

## 2025-03-08 RX ORDER — METOCLOPRAMIDE 10 MG/1
10 TABLET ORAL EVERY 6 HOURS PRN
Status: DISCONTINUED | OUTPATIENT
Start: 2025-03-08 | End: 2025-03-09 | Stop reason: HOSPADM

## 2025-03-08 RX ORDER — LIDOCAINE 40 MG/G
CREAM TOPICAL
Status: DISCONTINUED | OUTPATIENT
Start: 2025-03-08 | End: 2025-03-11 | Stop reason: HOSPADM

## 2025-03-08 RX ORDER — LOPERAMIDE HYDROCHLORIDE 2 MG/1
2 CAPSULE ORAL
Status: DISCONTINUED | OUTPATIENT
Start: 2025-03-08 | End: 2025-03-09 | Stop reason: HOSPADM

## 2025-03-08 RX ORDER — OXYTOCIN/0.9 % SODIUM CHLORIDE 30/500 ML
100-340 PLASTIC BAG, INJECTION (ML) INTRAVENOUS CONTINUOUS PRN
Status: DISCONTINUED | OUTPATIENT
Start: 2025-03-08 | End: 2025-03-11 | Stop reason: HOSPADM

## 2025-03-08 RX ORDER — FENTANYL CITRATE 50 UG/ML
100 INJECTION, SOLUTION INTRAMUSCULAR; INTRAVENOUS
Status: DISCONTINUED | OUTPATIENT
Start: 2025-03-08 | End: 2025-03-09 | Stop reason: HOSPADM

## 2025-03-08 RX ORDER — MISOPROSTOL 200 UG/1
400 TABLET ORAL
Status: DISCONTINUED | OUTPATIENT
Start: 2025-03-08 | End: 2025-03-09 | Stop reason: HOSPADM

## 2025-03-08 RX ORDER — METHYLERGONOVINE MALEATE 0.2 MG/ML
200 INJECTION INTRAVENOUS
Status: DISCONTINUED | OUTPATIENT
Start: 2025-03-08 | End: 2025-03-09 | Stop reason: HOSPADM

## 2025-03-08 RX ORDER — LOPERAMIDE HYDROCHLORIDE 2 MG/1
4 CAPSULE ORAL
Status: DISCONTINUED | OUTPATIENT
Start: 2025-03-08 | End: 2025-03-09 | Stop reason: HOSPADM

## 2025-03-08 RX ORDER — ONDANSETRON 2 MG/ML
4 INJECTION INTRAMUSCULAR; INTRAVENOUS EVERY 6 HOURS PRN
Status: DISCONTINUED | OUTPATIENT
Start: 2025-03-08 | End: 2025-03-09 | Stop reason: HOSPADM

## 2025-03-08 RX ORDER — IBUPROFEN 800 MG/1
800 TABLET, FILM COATED ORAL
Status: DISCONTINUED | OUTPATIENT
Start: 2025-03-08 | End: 2025-03-09 | Stop reason: HOSPADM

## 2025-03-08 RX ORDER — CITRIC ACID/SODIUM CITRATE 334-500MG
30 SOLUTION, ORAL ORAL
Status: DISCONTINUED | OUTPATIENT
Start: 2025-03-08 | End: 2025-03-09 | Stop reason: HOSPADM

## 2025-03-08 RX ORDER — METOCLOPRAMIDE HYDROCHLORIDE 5 MG/ML
10 INJECTION INTRAMUSCULAR; INTRAVENOUS EVERY 6 HOURS PRN
Status: DISCONTINUED | OUTPATIENT
Start: 2025-03-08 | End: 2025-03-09 | Stop reason: HOSPADM

## 2025-03-09 ENCOUNTER — ANESTHESIA EVENT (OUTPATIENT)
Dept: OBGYN | Facility: CLINIC | Age: 30
End: 2025-03-09
Payer: COMMERCIAL

## 2025-03-09 ENCOUNTER — ANESTHESIA (OUTPATIENT)
Dept: OBGYN | Facility: CLINIC | Age: 30
End: 2025-03-09
Payer: COMMERCIAL

## 2025-03-09 PROBLEM — Z36.89 ENCOUNTER FOR TRIAGE IN PREGNANT PATIENT: Status: RESOLVED | Noted: 2025-03-08 | Resolved: 2025-03-09

## 2025-03-09 PROBLEM — J45.909 CHILDHOOD ASTHMA WITHOUT COMPLICATION: Status: ACTIVE | Noted: 2025-03-09

## 2025-03-09 LAB
ERYTHROCYTE [DISTWIDTH] IN BLOOD BY AUTOMATED COUNT: 15.9 % (ref 10–15)
HCT VFR BLD AUTO: 37.4 % (ref 35–47)
HGB BLD-MCNC: 12.5 G/DL (ref 11.7–15.7)
MCH RBC QN AUTO: 26.8 PG (ref 26.5–33)
MCHC RBC AUTO-ENTMCNC: 33.4 G/DL (ref 31.5–36.5)
MCV RBC AUTO: 80 FL (ref 78–100)
PLATELET # BLD AUTO: 167 10E3/UL (ref 150–450)
RBC # BLD AUTO: 4.66 10E6/UL (ref 3.8–5.2)
T PALLIDUM AB SER QL: NONREACTIVE
WBC # BLD AUTO: 9.5 10E3/UL (ref 4–11)

## 2025-03-09 PROCEDURE — 258N000003 HC RX IP 258 OP 636: Performed by: ANESTHESIOLOGY

## 2025-03-09 PROCEDURE — 0UQMXZZ REPAIR VULVA, EXTERNAL APPROACH: ICD-10-PCS

## 2025-03-09 PROCEDURE — 250N000013 HC RX MED GY IP 250 OP 250 PS 637

## 2025-03-09 PROCEDURE — 0HQ9XZZ REPAIR PERINEUM SKIN, EXTERNAL APPROACH: ICD-10-PCS

## 2025-03-09 PROCEDURE — 00HU33Z INSERTION OF INFUSION DEVICE INTO SPINAL CANAL, PERCUTANEOUS APPROACH: ICD-10-PCS | Performed by: ANESTHESIOLOGY

## 2025-03-09 PROCEDURE — 250N000013 HC RX MED GY IP 250 OP 250 PS 637: Performed by: ADVANCED PRACTICE MIDWIFE

## 2025-03-09 PROCEDURE — 999N000016 HC STATISTIC ATTENDANCE AT DELIVERY

## 2025-03-09 PROCEDURE — 258N000003 HC RX IP 258 OP 636: Performed by: ADVANCED PRACTICE MIDWIFE

## 2025-03-09 PROCEDURE — 370N000003 HC ANESTHESIA WARD SERVICE: Performed by: ANESTHESIOLOGY

## 2025-03-09 PROCEDURE — 722N000001 HC LABOR CARE VAGINAL DELIVERY SINGLE

## 2025-03-09 PROCEDURE — 3E0R3BZ INTRODUCTION OF ANESTHETIC AGENT INTO SPINAL CANAL, PERCUTANEOUS APPROACH: ICD-10-PCS | Performed by: ANESTHESIOLOGY

## 2025-03-09 PROCEDURE — 250N000009 HC RX 250: Performed by: ANESTHESIOLOGY

## 2025-03-09 PROCEDURE — 250N000011 HC RX IP 250 OP 636: Performed by: ANESTHESIOLOGY

## 2025-03-09 PROCEDURE — 250N000011 HC RX IP 250 OP 636: Performed by: ADVANCED PRACTICE MIDWIFE

## 2025-03-09 PROCEDURE — 59400 OBSTETRICAL CARE: CPT

## 2025-03-09 PROCEDURE — 250N000011 HC RX IP 250 OP 636: Mod: JW | Performed by: ANESTHESIOLOGY

## 2025-03-09 PROCEDURE — 99254 IP/OBS CNSLTJ NEW/EST MOD 60: CPT

## 2025-03-09 PROCEDURE — 250N000009 HC RX 250: Performed by: ADVANCED PRACTICE MIDWIFE

## 2025-03-09 PROCEDURE — 120N000001 HC R&B MED SURG/OB

## 2025-03-09 RX ORDER — OXYTOCIN/0.9 % SODIUM CHLORIDE 30/500 ML
1-24 PLASTIC BAG, INJECTION (ML) INTRAVENOUS CONTINUOUS
Status: DISCONTINUED | OUTPATIENT
Start: 2025-03-09 | End: 2025-03-09 | Stop reason: HOSPADM

## 2025-03-09 RX ORDER — ACETAMINOPHEN 325 MG/1
650 TABLET ORAL EVERY 4 HOURS PRN
Status: DISCONTINUED | OUTPATIENT
Start: 2025-03-09 | End: 2025-03-11 | Stop reason: HOSPADM

## 2025-03-09 RX ORDER — EPHEDRINE SULFATE 50 MG/ML
5 INJECTION, SOLUTION INTRAMUSCULAR; INTRAVENOUS; SUBCUTANEOUS
Status: DISCONTINUED | OUTPATIENT
Start: 2025-03-09 | End: 2025-03-09 | Stop reason: HOSPADM

## 2025-03-09 RX ORDER — NALBUPHINE HYDROCHLORIDE 10 MG/ML
2.5-5 INJECTION INTRAMUSCULAR; INTRAVENOUS; SUBCUTANEOUS EVERY 6 HOURS PRN
Status: DISCONTINUED | OUTPATIENT
Start: 2025-03-09 | End: 2025-03-11 | Stop reason: HOSPADM

## 2025-03-09 RX ORDER — SODIUM CHLORIDE, SODIUM LACTATE, POTASSIUM CHLORIDE, CALCIUM CHLORIDE 600; 310; 30; 20 MG/100ML; MG/100ML; MG/100ML; MG/100ML
INJECTION, SOLUTION INTRAVENOUS CONTINUOUS PRN
Status: DISCONTINUED | OUTPATIENT
Start: 2025-03-09 | End: 2025-03-09 | Stop reason: HOSPADM

## 2025-03-09 RX ORDER — IBUPROFEN 800 MG/1
800 TABLET, FILM COATED ORAL EVERY 6 HOURS PRN
Status: DISCONTINUED | OUTPATIENT
Start: 2025-03-09 | End: 2025-03-11 | Stop reason: HOSPADM

## 2025-03-09 RX ORDER — OXYTOCIN/0.9 % SODIUM CHLORIDE 30/500 ML
340 PLASTIC BAG, INJECTION (ML) INTRAVENOUS CONTINUOUS PRN
Status: DISCONTINUED | OUTPATIENT
Start: 2025-03-09 | End: 2025-03-11 | Stop reason: HOSPADM

## 2025-03-09 RX ORDER — MISOPROSTOL 200 UG/1
400 TABLET ORAL
Status: DISCONTINUED | OUTPATIENT
Start: 2025-03-09 | End: 2025-03-11 | Stop reason: HOSPADM

## 2025-03-09 RX ORDER — BUPIVACAINE HYDROCHLORIDE 2.5 MG/ML
INJECTION, SOLUTION EPIDURAL; INFILTRATION; INTRACAUDAL; PERINEURAL
Status: COMPLETED | OUTPATIENT
Start: 2025-03-09 | End: 2025-03-09

## 2025-03-09 RX ORDER — HYDROCORTISONE 25 MG/G
CREAM TOPICAL 3 TIMES DAILY PRN
Status: DISCONTINUED | OUTPATIENT
Start: 2025-03-09 | End: 2025-03-11 | Stop reason: HOSPADM

## 2025-03-09 RX ORDER — BISACODYL 10 MG
10 SUPPOSITORY, RECTAL RECTAL DAILY PRN
Status: DISCONTINUED | OUTPATIENT
Start: 2025-03-09 | End: 2025-03-11 | Stop reason: HOSPADM

## 2025-03-09 RX ORDER — FENTANYL/ROPIVACAINE/NS/PF 2MCG/ML-.1
PLASTIC BAG, INJECTION (ML) EPIDURAL
Status: DISCONTINUED | OUTPATIENT
Start: 2025-03-09 | End: 2025-03-09 | Stop reason: HOSPADM

## 2025-03-09 RX ORDER — TRANEXAMIC ACID 10 MG/ML
1 INJECTION, SOLUTION INTRAVENOUS EVERY 30 MIN PRN
Status: DISCONTINUED | OUTPATIENT
Start: 2025-03-09 | End: 2025-03-11 | Stop reason: HOSPADM

## 2025-03-09 RX ORDER — TERBUTALINE SULFATE 1 MG/ML
0.25 INJECTION SUBCUTANEOUS
Status: DISCONTINUED | OUTPATIENT
Start: 2025-03-09 | End: 2025-03-09 | Stop reason: HOSPADM

## 2025-03-09 RX ORDER — MISOPROSTOL 200 UG/1
800 TABLET ORAL
Status: DISCONTINUED | OUTPATIENT
Start: 2025-03-09 | End: 2025-03-11 | Stop reason: HOSPADM

## 2025-03-09 RX ORDER — POLYETHYLENE GLYCOL 3350 17 G/17G
17 POWDER, FOR SOLUTION ORAL DAILY PRN
Status: DISCONTINUED | OUTPATIENT
Start: 2025-03-09 | End: 2025-03-11 | Stop reason: HOSPADM

## 2025-03-09 RX ORDER — METHYLERGONOVINE MALEATE 0.2 MG/ML
200 INJECTION INTRAVENOUS
Status: DISCONTINUED | OUTPATIENT
Start: 2025-03-09 | End: 2025-03-11 | Stop reason: HOSPADM

## 2025-03-09 RX ORDER — AMOXICILLIN 250 MG
2 CAPSULE ORAL
Status: DISCONTINUED | OUTPATIENT
Start: 2025-03-09 | End: 2025-03-11 | Stop reason: HOSPADM

## 2025-03-09 RX ORDER — OXYTOCIN 10 [USP'U]/ML
10 INJECTION, SOLUTION INTRAMUSCULAR; INTRAVENOUS
Status: DISCONTINUED | OUTPATIENT
Start: 2025-03-09 | End: 2025-03-11 | Stop reason: HOSPADM

## 2025-03-09 RX ORDER — LOPERAMIDE HYDROCHLORIDE 2 MG/1
2 CAPSULE ORAL
Status: DISCONTINUED | OUTPATIENT
Start: 2025-03-09 | End: 2025-03-11 | Stop reason: HOSPADM

## 2025-03-09 RX ORDER — KETOROLAC TROMETHAMINE 30 MG/ML
30 INJECTION, SOLUTION INTRAMUSCULAR; INTRAVENOUS EVERY 6 HOURS PRN
Status: DISCONTINUED | OUTPATIENT
Start: 2025-03-09 | End: 2025-03-11 | Stop reason: HOSPADM

## 2025-03-09 RX ADMIN — ACETAMINOPHEN 650 MG: 325 TABLET ORAL at 18:09

## 2025-03-09 RX ADMIN — EPHEDRINE SULFATE 5 MG: 5 INJECTION INTRAVENOUS at 03:02

## 2025-03-09 RX ADMIN — Medication: at 01:07

## 2025-03-09 RX ADMIN — ACETAMINOPHEN 650 MG: 325 TABLET ORAL at 21:10

## 2025-03-09 RX ADMIN — Medication 2 MILLI-UNITS/MIN: at 04:03

## 2025-03-09 RX ADMIN — EPHEDRINE SULFATE 5 MG: 5 INJECTION INTRAVENOUS at 03:10

## 2025-03-09 RX ADMIN — SODIUM CHLORIDE, SODIUM LACTATE, POTASSIUM CHLORIDE, AND CALCIUM CHLORIDE: .6; .31; .03; .02 INJECTION, SOLUTION INTRAVENOUS at 06:58

## 2025-03-09 RX ADMIN — SODIUM CHLORIDE, SODIUM LACTATE, POTASSIUM CHLORIDE, AND CALCIUM CHLORIDE 500 ML: .6; .31; .03; .02 INJECTION, SOLUTION INTRAVENOUS at 00:31

## 2025-03-09 RX ADMIN — ONDANSETRON 4 MG: 2 INJECTION, SOLUTION INTRAMUSCULAR; INTRAVENOUS at 00:31

## 2025-03-09 RX ADMIN — EPHEDRINE SULFATE 5 MG: 5 INJECTION INTRAVENOUS at 01:22

## 2025-03-09 RX ADMIN — KETOROLAC TROMETHAMINE 30 MG: 30 INJECTION, SOLUTION INTRAMUSCULAR at 21:10

## 2025-03-09 RX ADMIN — METHYLERGONOVINE MALEATE 200 MCG: 0.2 INJECTION, SOLUTION INTRAMUSCULAR; INTRAVENOUS at 12:20

## 2025-03-09 RX ADMIN — BUPIVACAINE HYDROCHLORIDE 5 ML: 2.5 INJECTION, SOLUTION EPIDURAL; INFILTRATION; INTRACAUDAL at 01:15

## 2025-03-09 RX ADMIN — ACETAMINOPHEN 650 MG: 325 TABLET ORAL at 05:11

## 2025-03-09 RX ADMIN — EPHEDRINE SULFATE 5 MG: 5 INJECTION INTRAVENOUS at 01:34

## 2025-03-09 RX ADMIN — SODIUM CHLORIDE, SODIUM LACTATE, POTASSIUM CHLORIDE, AND CALCIUM CHLORIDE: .6; .31; .03; .02 INJECTION, SOLUTION INTRAVENOUS at 01:04

## 2025-03-09 RX ADMIN — ACETAMINOPHEN 650 MG: 325 TABLET ORAL at 14:04

## 2025-03-09 RX ADMIN — Medication: at 09:39

## 2025-03-09 RX ADMIN — KETOROLAC TROMETHAMINE 30 MG: 30 INJECTION, SOLUTION INTRAMUSCULAR at 14:23

## 2025-03-09 ASSESSMENT — ACTIVITIES OF DAILY LIVING (ADL)
ADLS_ACUITY_SCORE: 28
ADLS_ACUITY_SCORE: 18
ADLS_ACUITY_SCORE: 27
ADLS_ACUITY_SCORE: 18
ADLS_ACUITY_SCORE: 19
ADLS_ACUITY_SCORE: 28
ADLS_ACUITY_SCORE: 27
ADLS_ACUITY_SCORE: 19
ADLS_ACUITY_SCORE: 18
ADLS_ACUITY_SCORE: 19
ADLS_ACUITY_SCORE: 19
ADLS_ACUITY_SCORE: 18
ADLS_ACUITY_SCORE: 19
ADLS_ACUITY_SCORE: 18
ADLS_ACUITY_SCORE: 19
ADLS_ACUITY_SCORE: 28
ADLS_ACUITY_SCORE: 19
ADLS_ACUITY_SCORE: 18

## 2025-03-09 NOTE — PROGRESS NOTES
"CNM PROGRESS NOTE    SUBJECTIVE:  Pt reports feeling adequate relief with epidural and is looking forward to sleeping.    OBJECTIVE:  BP 95/64   Temp 98.3  F (36.8  C) (Oral)   Resp 16   Ht 1.626 m (5' 4\")   Wt 65.8 kg (145 lb)   LMP 2024 (Within Days)   BMI 24.89 kg/m      Fetal heart tones: Baseline 160   Variability: moderate  Accelerations: present  Decelerations: absent    Contractions: Pt is olivia every 3-6 minutes, lasting 70-90 seconds and palpates strong    Cervix: 5.5/ 90% / 0, Vtx  ROM: not ruptured    Pitocin- none  Antibiotics- none  Cervical ripening: N/A    ASSESSMENT:  IUP @ 40w6d early labor and minimal/no progress   GBS- negative  Cat 1 FHR  Hx of seizure-like activity  Hx of ovarian cancer  Hx of bilateral mastectomy and unilateral oophorectomy  O pos     PLAN:   Anticipate   Labor augmentation with Pitocin; risks an benefits reviewed  Reevaluate in 4 hours/PRN    BENNY OconnellM   "

## 2025-03-09 NOTE — ANESTHESIA PREPROCEDURE EVALUATION
Anesthesia Pre-Procedure Evaluation    Patient: Radha Graves   MRN: [id number] : 1995        Procedure :           Past Medical History:   Diagnosis Date    Anemia     Breast disorder     Endometriosis     Fibroadenoma of both breasts     BIRADS 4 - Suspicious. US-Guided Biopsy of the Right Breast Mass at 11:00 6cmFN on 2020 revealed benign results of fibroadenoma without atypia or malignancy. Genetic Testing with [employer] 84-gene panel 10/13/2020 was Negative for pathogenic mutations.    Ovarian cancer, left (H) 2010    Oopherectomy   Stage 1      Past Surgical History:   Procedure Laterality Date    MASTECTOMY, RECONSTRUCT BREAST, COMBINED Bilateral     [id number]    oopherectomy Right       Allergies   Allergen Reactions    Penicillins     Oxycodone Nausea and Vomiting     Few days after bilateral mastectomy reported onset of nausea      Social History     Tobacco Use    Smoking status: Never     Passive exposure: Never    Smokeless tobacco: Never   Substance Use Topics    Alcohol use: Not Currently      Wt Readings from Last 1 Encounters:   25 65.8 kg (145 lb)        Anesthesia Evaluation   Pt has had prior anesthetic. Type: General and Regional.    No history of anesthetic complications       ROS/MED HX  ENT/Pulmonary:  - neg pulmonary ROS     Neurologic: Comment: Possible seizure history      Cardiovascular:  - neg cardiovascular ROS     METS/Exercise Tolerance:     Hematologic:  - neg hematologic  ROS     Musculoskeletal:       GI/Hepatic:  - neg GI/hepatic ROS     Renal/Genitourinary:       Endo: Comment: History of ovarian cancer      Psychiatric/Substance Use:  - neg psychiatric ROS     Infectious Disease:       Malignancy:       Other:   pregnant         Physical Exam    Airway        Mallampati: II   TM distance: > 3 FB   Neck ROM: full     Respiratory Devices and Support         Dental  no notable dental history         Cardiovascular   cardiovascular exam normal       "    Pulmonary   pulmonary exam normal                OUTSIDE LABS:  CBC:   Lab Results   Component Value Date    WBC 9.5 03/08/2025    WBC 9.1 10/08/2024    HGB 12.5 03/08/2025    HGB 11.2 (L) 11/12/2024    HCT 37.4 03/08/2025    HCT 33.2 (L) 10/08/2024     03/08/2025     10/08/2024     BMP: No results found for: \"NA\", \"POTASSIUM\", \"CHLORIDE\", \"CO2\", \"BUN\", \"CR\", \"GLC\"  COAGS: No results found for: \"PTT\", \"INR\", \"FIBR\"  POC: No results found for: \"BGM\", \"HCG\", \"HCGS\"  HEPATIC: No results found for: \"ALBUMIN\", \"PROTTOTAL\", \"ALT\", \"AST\", \"GGT\", \"ALKPHOS\", \"BILITOTAL\", \"BILIDIRECT\", \"LEYLA\"  OTHER:   Lab Results   Component Value Date    A1C 5.1 10/08/2024    TSH 1.04 10/08/2024       Anesthesia Plan    ASA Status:  3       Anesthesia Type: Epidural.              Consents    Anesthesia Plan(s) and associated risks, benefits, and realistic alternatives discussed. Questions answered and patient/representative(s) expressed understanding.     - Discussed:     - Discussed with:  Patient, Spouse            Postoperative Care            Comments:    Other Comments: Notably, Radha had a childhood allergist tell her that she was allergic to some unknown compound and should not get an epidural.  Since that time she has had both general and local anesthesia without incident.  My colleague, Dr. Garcia, wrote a very thorough note about this on 3/2/25, and I discussed this again with Radha prior to placing epidural.  Due to her tolerance of previous anesthetics, tolerance of local anesthesia at the dentist's office, and unknown etiology of this statement by the allergist, we will proceed, with monitoring as usual.    Patient requests labor analgesia.  Patient identified.  Medical history reviewed; lab results and allergies reviewed.  Patient interviewed and examined.  Risks(including headache, back pain, low blood pressure, high block and related complications, failed block and/or inadequate analgesia, " infection, bleeding and nerve injury), alternatives and procedure discussed and questions answered. Patient gives consent for epidural.  Correct patient, procedure/site verified.Hands washed, sterile gloves and mask worn.           Nena Jessica MD    I have reviewed the pertinent notes and labs in the chart from the past 30 days and (re)examined the patient.  Any updates or changes from those notes are reflected in this note.    Clinically Significant Risk Factors Present on Admission

## 2025-03-09 NOTE — PROVIDER NOTIFICATION
Dr. Jessica with anesthesia notified of pt's heart rate variability of 90-130s post epidural. 500ml IVF bolus given and ephedrine given x2 for hypotension. pt comfortable with epidural.

## 2025-03-09 NOTE — PROGRESS NOTES
"Asked to present to room 244 for a patient with suspected seizure activity.     Upon arrival, 2 RN present and 2 CNM.    Patient is lying flat in bed- significant other holding baby.  She is quiet and answers questions.  She tells me her R arm is painful and her head hurts.      She has recently been given Tylenol and Toradol.       There is no muscle contractions.  No loss of bladder/bowel.  Fundal checks are approp-  CNM managing her PPH.       This is her normal \"seizure activity\" she states.  Her significant other confirms this.  States there is nothing more for us to do than wait through it.  Patient declines any other intervention needed at this time.      CNM declines need for formal consult at this time.  There is no evidence of pre-E or ecclampsia at this time.      O:  /59   Temp 98.9  F (37.2  C) (Oral)   Resp 16   Ht 1.626 m (5' 4\")   Wt 65.8 kg (145 lb)   LMP 06/06/2024 (Within Days)   SpO2 97%   BMI 24.89 kg/m        Please notify if any further concerns.     Nena Grider DO, FACOG  3/9/2025 2:44 PM     "

## 2025-03-09 NOTE — ANESTHESIA PROCEDURE NOTES
"Epidural catheter Procedure Note    Pre-Procedure   Staff -        Anesthesiologist:  Nena Jessica MD       Performed By: anesthesiologist       Location: OB       Procedure Start/Stop Times: 3/8/2025 12:58 AM and 3/9/2025 1:16 AM       Pre-Anesthestic Checklist: patient identified, IV checked, risks and benefits discussed, informed consent, monitors and equipment checked, pre-op evaluation, at physician/surgeon's request and post-op pain management  Timeout:       Correct Patient: Yes        Correct Procedure: Yes        Correct Site: Yes        Correct Position: Yes   Procedure Documentation  Procedure: epidural catheter         Patient Position: sitting       Skin prep: Chloraprep       Local skin infiltrated with 1 mL of 1% lidocaine.        Insertion Site: L3-4. (midline approach).       Technique: LORT saline        MARLON at 5 cm.       Needle Type: Barbara       Needle Gauge: 18.        Needle Length (Inches): 3.5        Catheter: 20 G.          Catheter threaded easily.         5 cm epidural space.         Threaded 10 cm at skin.         # of attempts: 1 and  # of redirects:     Assessment/Narrative         Paresthesias: No.       Test dose of 3 mL lidocaine 1.5% w/ 1:200,000 epinephrine at 01:09 CST.         Test dose negative, 3 minutes after injection, for signs of intravascular, subdural, or intrathecal injection.       Insertion/Infusion Method: LORT saline       Aspiration negative for Heme or CSF via Epidural Catheter.    Medication(s) Administered   0.25% Bupivacaine PF (Epidural) - EPIDURAL   5 mL - 3/9/2025 1:15:00 AM  Medication Administration Time: 3/8/2025 12:58 AM      FOR [facility] ([facility]) ONLY:   Pain Team Contact information: please page the Pain Team Via [employer]. Search \"Pain\". During daytime hours, please page the attending first. At night please page the resident first.      "

## 2025-03-09 NOTE — L&D DELIVERY NOTE
Delayed entry due to patient cares      Radha is a 29 year old female who presented to [facility] on 3/9/2025 for spontaneous labor.  She was accompanied by partner, Seth, who is supportive.     Prenatal, medical, surgical, obstetrical, and family history, as well as lab results, ROS, and admission physical exam are detailed in admission H&P.     Labor progress detailed in progress notes.    Birth:  At 0907, patient was completely dilated.  She began pushing at 0913. Radha pushed in left and right-side lying, tug-of-war, hands and knees, closed knee, happy baby and ultimately modified ray-fowlers. Labor was complicated by persistent fetal tachycardia in absence of maternal fever or MSAF. Labor was augmented by pitocin. NICU team requested bedside secondary to fetal tachycardia.  Normal spontaneous vaginal birth, under epidural anesthesia of a viable female infant at 1211. Restituted direct OA to DWAYNE, no nuchal cord noted. Anterior shoulder delivered without difficulty with further restitution to BILL. Subsequent soft tissue dystocia noted of infant chest and AC. LUIS Isbell, assisted with delivery of infant to abdomen by gentle traction under bilateral fetal axillae. Infant was placed on maternal abdomen for 15 seconds at which point cord was double clamped by LUIS due to concern for palpable HR <100bpm and lack of spontaneous cry, FOB assisted to cut cord. [place] handed to awaiting NICU staff for assessment - see their note. Apgars 8 at one minute and 9 at five minutes of life. Pitocin 30mU in 500 mL was given IV for active management of the third stage followed by IM methergine for ongoing uterine prophylaxis.    The placenta was delivered in a Starr mechanism followed by spontaneous expulsion of a large clot and was found to be intact with a 3 vessel cord.  The fundus firmed with massage.    Exam of perineum, periurethral area and lower vagina was completed and found a first-degree  perineal laceration extending superficially into the left labia. It was repaired with 3-0 Vicryl under epidural anesthesia with three interrupted stitches to aid re-approximation. Patient tolerated well. Excellent hemostasis was noted. Needle count correct.     Both mom and baby are bonding and stable in the 4th stage of labor.     Infant weight pending at this time to allow for bonding and skin-to-skin.       Birth was attended by BENNY Pacheco CNM and assisted by Jenny Isbell CNM of the [facility].

## 2025-03-09 NOTE — PLAN OF CARE
"  Problem: Adult Inpatient Plan of Care  Goal: Plan of Care Review  Description: The Plan of Care Review/Shift note should be completed every shift.  The Outcome Evaluation is a brief statement about your assessment that the patient is improving, declining, or no change.  This information will be displayed automatically on your shift  note.  Outcome: Progressing  Flowsheets (Taken 3/9/2025 0756)  Plan of Care Reviewed With: patient     Problem: Adult Inpatient Plan of Care  Goal: Patient-Specific Goal (Individualized)  Description: You can add care plan individualizations to a care plan. Examples of Individualization might be:  \"Parent requests to be called daily at 9am for status\", \"I have a hard time hearing out of my right ear\", or \"Do not touch me to wake me up as it startles  me\".  Outcome: Progressing     Problem: Adult Inpatient Plan of Care  Goal: Absence of Hospital-Acquired Illness or Injury  Outcome: Progressing  Intervention: Prevent Skin Injury  Recent Flowsheet Documentation  Taken 3/9/2025 0730 by Fela Herrera RN  Body Position:   left   side-lying 30 degrees  Intervention: Prevent Infection  Recent Flowsheet Documentation  Taken 3/9/2025 0730 by Fela Herrera RN  Infection Prevention:   single patient room provided   hand hygiene promoted     Problem: Adult Inpatient Plan of Care  Goal: Absence of Hospital-Acquired Illness or Injury  Intervention: Prevent Skin Injury  Recent Flowsheet Documentation  Taken 3/9/2025 0730 by Fela Herrera RN  Body Position:   left   side-lying 30 degrees     Problem: Adult Inpatient Plan of Care  Goal: Absence of Hospital-Acquired Illness or Injury  Intervention: Prevent Infection  Recent Flowsheet Documentation  Taken 3/9/2025 0730 by Fela Herrera RN  Infection Prevention:   single patient room provided   hand hygiene promoted     Problem: Adult Inpatient Plan of Care  Goal: Optimal Comfort and Wellbeing  Intervention: Provide Person-Centered Care  Recent " Flowsheet Documentation  Taken 3/9/2025 0730 by Fela Herrera RN  Trust Relationship/Rapport:   care explained   choices provided   emotional support provided   empathic listening provided   questions answered   questions encouraged   reassurance provided   thoughts/feelings acknowledged     Problem: Adult Inpatient Plan of Care  Goal: Readiness for Transition of Care  Outcome: Progressing     Problem: Labor  Goal: Absence of Infection Signs and Symptoms  Outcome: Progressing  Intervention: Prevent or Manage Infection  Recent Flowsheet Documentation  Taken 3/9/2025 0730 by Fela Herrera RN  Infection Prevention:   single patient room provided   hand hygiene promoted  Infection Management: aseptic technique maintained     Problem: Labor  Goal: Normal Uterine Contraction Pattern  Outcome: Progressing  Intervention: Monitor and Manage Uterine Activity  Recent Flowsheet Documentation  Taken 3/9/2025 0730 by Fela Herrera RN  Fluid/Electrolyte Management: fluids provided   Goal Outcome Evaluation:      Plan of Care Reviewed With: patient

## 2025-03-09 NOTE — PROGRESS NOTES
"Labor Progress Note:    Patient Name:  Radha Graves  :      1995  MRN:      [id number]    Assessment:    IUP at 40w6d in active labor   - Pitoin 4mU/mL  - Category I FHT  - Stable maternal and fetal well-being  - GBS negative  - O+ blood type  - Hx seizure-like activity without formal seizure disorder diagnosis   - New report hx of childhood asthma, never intubated,   - Hx ovarian cancer  - Hx bilateral mastectomy, unilateral oophorectomy    Plan:   - Initiate pushing. Continuous CNM presence at bedside for second stage of labor.   -Continue standard CNM support & surveillance management. Encourage position changes and rest as desired.  -Anticipate progress and NSVB.     Subjective:  Radha is coping well with contractions. Feeling more rectal pressure and consents to SVE. PO fluid intake.. Calle catheter draining clear light yellow urine without issue. Partner Cortezz supportive at bedside.    Objective:  /56   Temp 98.4  F (36.9  C) (Oral)   Resp 16   Ht 1.626 m (5' 4\")   Wt 65.8 kg (145 lb)   LMP 2024 (Within Days)   SpO2 99%   BMI 24.89 kg/m      FHR: 145 baseline, moderate variability, 15 X 15 accels present,  decels absent. Any changes or trends over time?: no.  Contractions: q 1.5-3 minutes, 60 duration, strong to palpation. Soft resting tone. Relaxation time: adequate. Excessive uterine activity: absent.   Cervix: 10, 100%, +1,        30 minutes on the date of the encounter doing chart review, review of test results, interpretation of tests, patient visit, documentation, and discussion with family    Provider:  BENNY Gasca CNM      Date:  3/9/2025  Time:  11:31 AM     "

## 2025-03-09 NOTE — PROGRESS NOTES
"Vaginal Delivery Postpartum Day      Patient Name:  Radha Graves  :      1995  MRN:      [id number]    Subjective:  Radha reporting new-onset nausea and right-arm pain with ongoing headache. Nausea started after birth while eating first meal. Pain in right arm is diffuse without specific localization; touch worsens pain. Just received IV toradol and tyenold. Partner who remains supportive bedside reports that previous seizure-like episodes were preceded by right-arm pain and headaches; he does not recall Radha ever vomiting, voiding or defecating during an episode. He reports that Radha has not received formal neurology or medical care for these episodes previously but has had an estimated 4-5 episodes in the past 12-months.  Radha previously reported that both her mother and maternal grandmother also have seizure-like activity in past - is uncertain of formal diagnoses or if require medication management as does not have active communication with them. Accepts STAT hospitalist consult.       Objective:  /59   Temp 98.9  F (37.2  C) (Oral)   Resp 16   Ht 1.626 m (5' 4\")   Wt 65.8 kg (145 lb)   LMP 2024 (Within Days)   SpO2 97%   BMI 24.89 kg/m    General:  Fatigued, eyes closed. Speech soft, intelligible   Ext: 2+ reflexes bilaterally brachioradialis, deep tendon. Right arm diffusely tender to palpation  Skin: No rash, erythema, ecchymosis  Abdomen:  Soft, non-tender.  Fundus firm @ U, non-tender, midline.  Bleeding:  Minimal rubra lochia, without clots or odor.  Legs:  No edema. No signs of DVT.    Assessment:   - 2.5 hours postpartum, s/p NSVB  - Postpartum course complicated by ongoing headache, new-onset right arm pain, nausea. Low suspicion preeclampsia or eclampsia given no documented hypertension. Concern for repeat seizure-like activity given patient's medical history.   - Normotensive  - Formula feeding      Plan:    - STAT neurology consult. Informed by charge RN that " neurology unavailable; STAT hospitalist consult placed requesting evaluation and management. Anticipate MRI, cardiac assessment, and management recommendations for postpartum period.   - MARIAM Grider called bedside during CNM evaluation,  in agreement with plan to continue close monitoring and hospitalist consult. Formal consult deferred given hospitalist consult  - Serial vitals monitoring, close vigilance seizure pro-dromes.   - Consider repeat catheterization if patient unable to tolerate ambulation  - Maintenance IV fluid initiation after completion post-partum pitocin    40minutes on the date of the encounter doing chart review, patient visit, documentation, and discussion with other provider(s)    Provider:  BENNY Pacheco CNM, BENNY, LUIS.   BENNY Gasca, LUIS assisted evaluation and management  Date:  3/9/2025  Time:  2:45 PM

## 2025-03-09 NOTE — PROGRESS NOTES
"CNM PROGRESS NOTE    SUBJECTIVE:  Pt reports feeling some pressure with contractions. Is able to rest.     OBJECTIVE:  BP 92/54   Temp 98.3  F (36.8  C) (Oral)   Resp 16   Ht 1.626 m (5' 4\")   Wt 65.8 kg (145 lb)   LMP 2024 (Within Days)   SpO2 97%   BMI 24.89 kg/m      Fetal heart tones: Baseline 135   Variability: mod  Accelerations: present  Decelerations: occasional    Contractions: Pt is olivia every 2-3 minutes, lasting 70-90 seconds and palpates strong    Cervix: deferred  ROM: clear fluid    Pitocin- 4 mu/min.  Antibiotics- none  Cervical ripening: N/A    ASSESSMENT:  IUP @ 40w6d early labor to active labor   GBS- negative  Cat 2 FHR  Hx of seizure-like activity  Hx of ovarian cancer  Hx of bilateral mastectomy and unilateral oophorectomy  O pos     PLAN:   Continue present management  Report given to oncoming CNABIDA Isbell  Anticipate     BENNY Oconnell CNM   "

## 2025-03-09 NOTE — PROGRESS NOTES
"Labor Progress Note:    Patient Name:  Radha Graves  :      1995  MRN:      [id number]    Assessment:    -IUP at 40.6  - Second stage of labor; pushing x2 hours with minimal descent initially from +1 to +2 followed by rapid descent to +4  - Absence maternal fever, normotensive  -Fetal tachycardia, Cat II x 60 minutes  -S/p IV fluid bolus  - Pitoin 6mU/mL      Plan:   -Pitocin increased to 6 mU to assist with expulsive efforts at 1006. Pt has been pushing in side-lying, supine, tug-of-war with squat bar, internal knee rotation/knees to chest, and hands and knees.   - CNMs stepped away from bedside to place consult to IHOB. Consult placed to IHOB for operative birth assessment given fetal tachycrdia and minimal descent after 120 minutes pushing. After consult requested, CNMs returned to room to find adequate fetal descent and renewed pushing efforts to +3/+4 station with birth imminent. IHOB met at pt door and informed of progress with no formal consult at this time.   -Continuous CNM presence at bedside until delivery.  -Plan to call NICU team to delivery due to category II tracing.   -Anticipate progress and NSVB.    Subjective:  Radha is coping well with contractions. Pushing well with strong maternal efforts though starting to feel tired. Open to suggestions for pushing positions. Denies feeling warm, chills or shakes, though mild headache persists.  PO fluid intake. Calle draining without issue. Partner Cortezz supportive at bedside.    Objective:  /56   Temp 98.4  F (36.9  C) (Oral)   Resp 16   Ht 1.626 m (5' 4\")   Wt 65.8 kg (145 lb)   LMP 2024 (Within Days)   SpO2 99%   BMI 24.89 kg/m      FHR: 180 baseline, minimal variability, 15 X 15 accels absent, early and intermittent late decels present. Any changes or trends over time?: yes: fetal tachycardia with decelerations noted  Contractions: q 2.5-3.5 minutes, 60-90 seconds duration, strong to palpation. Soft resting tone. " Relaxation time: adequate. Excessive uterine activity: absent.   Cervix: 10/100%/+1 caput to +3/ +bloody show,        120 minutes on the date of the encounter doing chart review, review of test results, interpretation of tests, patient visit, documentation, and discussion with other provider(s)    Provider:  BENNY Gasca CNM with BENNY Pacheco CNM in orientation       Date:  3/9/2025  Time:  11:32 AM

## 2025-03-09 NOTE — PLAN OF CARE
Goal Outcome Evaluation:      Plan of Care Reviewed With: patient  40wk6d,  assumed care labor pitocin infusing at 4 M Unit and labor epidural infusing with adequate pain relieved. Patient continue to progress through labor, SVE 10/100/2, patient prep and started pushing at 0913, continue position changes with pushing, moderate variability with accels, periods of minimal variability, fetal tachycardia with pushing, LR bolus given. Moderate/strong to palpate contractions. [facility] delivery team called to bedside, baby Girl delivery 1211, baby moved to warmer immediately for assessment.  Apgar of 8 and 9. Baby brought back to mom and skin to skin initiated, patient bonding well with baby, straight cath X2 with adequate urine output. First degree laceration with repaired, swelling labia, using ice pack on perineum for comfort.  Writer at bedside, patient c/o of headache and right arm numbness. LUIS Isbell and Bharath called to bedside STAT to evaluate patient .Tolerable pain control with Toradol and tylenol. Now, Fundus firm at the umbilicus, midline with scant lochia flow,  total QBL of 912ml. CNMs and and OB staff at bedside  to evaluate patient. Patient now stable, resting comfortably in bed, denies headache, VS stable,  significant other at bedside, supportive with care, handoff to Terrie Fierro RN

## 2025-03-09 NOTE — PROGRESS NOTES
"Labor Progress Note:    Patient Name:  Radha Graves  :      1995  MRN:      [id number]    Assessment:    - IUP at 40w6d in active labor   - Pitoin 4mU/mL  - Category I FHT  - Stable maternal and fetal well-being  - GBS negative  - O+ blood type  - Hx seizure-like activity without formal seizure disorder diagnosis   - New report hx of childhood asthma, never intubated,   - Hx ovarian cancer  - Hx bilateral mastectomy, unilateral oophorectomy    Plan:   - IHOB [place] updated of patient laboring given CNM plan per 3/2/25, no formal consult needed or requested  - Continue pitocin titration per protocol  -Continue standard CNM support & surveillance management. Encourage position changes, ambulation, rest as desired.  - Anticipate progress and NSVB.   - PPH management: plan to defer hemabate given asthma history  - Reevaluate progress in 20-30 minutes or sooner as clinically indicated and begin pushing if complete  - Accepts neurology consult postpartum      Subjective:  Radha is coping well with contractions. Tolerating IV fluids and esposito catheter. Voices preference for deferred cervical exam for 20-30minutes with position change in interim. Headache continues but without visual changes or increased intensity. Reports no hx of headaches preceding prior seizure-like activities. Reports mother and maternal grandmother also have seizure-like activity in past - is uncertain of formal diagnoses or if require medication management. Shares has childhood asthma history that never required intubation. Was managed with albuterol and did not require during pregnancy. Partner resting bedside.    Objective:  /66   Temp (P) 98  F (36.7  C) (Oral)   Resp 16   Ht 1.626 m (5' 4\")   Wt 65.8 kg (145 lb)   LMP 2024 (Within Days)   SpO2 100%   BMI 24.89 kg/m      FHR: 150bpm baseline, moderate variability, 15 X 15 accels present, decels absent. Any changes or trends over time?: yes baseline change to " 150bpm  Contractions: q 3-4 minutes, 40-50seconds duration, moderate to palpation. Soft resting tone. Relaxation time: adequate. Excessive uterine activity: absent.   Cervix: deferred      2minutes on the date of the encounter doing chart review, patient visit, and documentation    Provider:  BENNY Pacheco CNM    Date:  3/9/2025  Time:  8:10 AM

## 2025-03-09 NOTE — PROGRESS NOTES
"Data: [facility] presented to [facility]: 3/8/2025 11:24 PM.  Reason for maternal/fetal assessment is uterine contractions and vaginal bleeding. Patient reports contractions and bleeding started \"about 45 minutes ago\". Patient denies leaking of vaginal fluid/rupture of membranes, headache, visual disturbances, epigastric or RUQ pain, significant edema. Patient reports fetal movement is normal. Patient is a 40w6d . Prenatal record reviewed. Pregnancy has been complicated by low blood pressure during pregnancy . Support person is present.     Fetal HR baseline was 150, variability is moderate. Uterine assessment is strong during contractions and soft at rest. Cervix 5 cm dilated and 90% effaced. Fetal station 0. Fetal presentation vertex per Le[place]olds and cervical exam by Kristen Teran CNM.     Vital signs wnl. Patient reports pain and is coping.     Action: Verbal consent for EFM. Triage assessment completed. Patient does not meet criteria for early labor discharge.     Response: Patient verbalized agreement with plan. Kristen Teran CNM to enter intrapartum orders. Pt requesting an epidural.           "

## 2025-03-09 NOTE — PROGRESS NOTES
"Labor Progress Note:    Patient Name:  Radha Graves  :      1995  MRN:      [id number]    Assessment:    - IUP at 40w6d in active labor   - Pitoin 4mU/mL  - Category I FHT  - Stable maternal and fetal well-being  - GBS negative  - O+ blood type  - Hx seizure-like activity without formal seizure disorder diagnosis   - Hx ovarian cancer  - Hx bilateral mastectomy , unilateral oophorectomy    Plan:   - Assumed care with LUIS Isbell from LUIS Teran at 0600  - Will update IHOB that patient is laboring as per 3/2/25 CNM management plan, no formal consult needed at this time  - Continue pitocin titration per protocol  - Continue position changes, left side-lying for continued effacement  - Continue standard CNM support & surveillance management. Encourage position changes, ambulation, rest as desired.  - Maintain vigilance for headache return.   -Anticipate progress and NSVB.   -Reevaluate progress in 30 minutes to 1 hour or sooner as clinically indicated.       Subjective:  Radha is coping well with contractions. Has had headache that previously resolved with tylenol. Thinks is positional as is \"stronger\" on right side; reports relieved by position change to left-side lying at this time. Tolerating IV fluids. Voiding via indwelling Calle catheter without issue. Partner, Cortex, supportive at bedside. Planning to cut  cord as able.    Objective:  BP 90/55   Temp 98.2  F (36.8  C) (Oral)   Resp 16   Ht 1.626 m (5' 4\")   Wt 65.8 kg (145 lb)   LMP 2024 (Within Days)   SpO2 99%   BMI 24.89 kg/m      FHR: 140bpm baseline, moderate variability, 15 X 15 accels present, decels absent. Any changes or trends over time?: no.  Contractions: q 2-3 minutes, 30-50 seconds duration, strong to palpation. Soft resting tone. Relaxation time: adequate. Excessive uterine activity: absent.   Cervix: 9/90%/+1/ +bloody show/ cervix thicker L > R side  Other: headache, no visual changes    30 minutes on the date of " the encounter doing chart review, review of outside records, patient visit, documentation, and discussion with other provider(s)    Provider:  BENNY Pacheco CNM    Date:  3/9/2025  Time:  6:55 AM

## 2025-03-10 ENCOUNTER — APPOINTMENT (OUTPATIENT)
Dept: MRI IMAGING | Facility: CLINIC | Age: 30
End: 2025-03-10
Payer: COMMERCIAL

## 2025-03-10 ENCOUNTER — APPOINTMENT (OUTPATIENT)
Dept: NEUROLOGY | Facility: CLINIC | Age: 30
End: 2025-03-10
Attending: PSYCHIATRY & NEUROLOGY
Payer: COMMERCIAL

## 2025-03-10 LAB — HGB BLD-MCNC: 9.3 G/DL (ref 11.7–15.7)

## 2025-03-10 PROCEDURE — 95819 EEG AWAKE AND ASLEEP: CPT

## 2025-03-10 PROCEDURE — 70553 MRI BRAIN STEM W/O & W/DYE: CPT

## 2025-03-10 PROCEDURE — 95816 EEG AWAKE AND DROWSY: CPT | Mod: 26 | Performed by: PSYCHIATRY & NEUROLOGY

## 2025-03-10 PROCEDURE — A9585 GADOBUTROL INJECTION: HCPCS

## 2025-03-10 PROCEDURE — 255N000002 HC RX 255 OP 636

## 2025-03-10 PROCEDURE — 250N000013 HC RX MED GY IP 250 OP 250 PS 637

## 2025-03-10 PROCEDURE — 36415 COLL VENOUS BLD VENIPUNCTURE: CPT

## 2025-03-10 PROCEDURE — 99232 SBSQ HOSP IP/OBS MODERATE 35: CPT | Performed by: FAMILY MEDICINE

## 2025-03-10 PROCEDURE — 120N000001 HC R&B MED SURG/OB

## 2025-03-10 PROCEDURE — 250N000011 HC RX IP 250 OP 636: Performed by: MIDWIFE

## 2025-03-10 PROCEDURE — 258N000003 HC RX IP 258 OP 636: Performed by: MIDWIFE

## 2025-03-10 PROCEDURE — 85018 HEMOGLOBIN: CPT

## 2025-03-10 RX ORDER — DIPHENHYDRAMINE HYDROCHLORIDE 50 MG/ML
50 INJECTION, SOLUTION INTRAMUSCULAR; INTRAVENOUS
Status: DISCONTINUED | OUTPATIENT
Start: 2025-03-10 | End: 2025-03-11 | Stop reason: HOSPADM

## 2025-03-10 RX ORDER — GADOBUTROL 604.72 MG/ML
6.5 INJECTION INTRAVENOUS ONCE
Status: COMPLETED | OUTPATIENT
Start: 2025-03-10 | End: 2025-03-10

## 2025-03-10 RX ORDER — METHYLPREDNISOLONE SODIUM SUCCINATE 40 MG/ML
40 INJECTION INTRAMUSCULAR; INTRAVENOUS
Status: DISCONTINUED | OUTPATIENT
Start: 2025-03-10 | End: 2025-03-11 | Stop reason: HOSPADM

## 2025-03-10 RX ORDER — MEPERIDINE HYDROCHLORIDE 25 MG/ML
25 INJECTION INTRAMUSCULAR; INTRAVENOUS; SUBCUTANEOUS
Status: DISCONTINUED | OUTPATIENT
Start: 2025-03-10 | End: 2025-03-11 | Stop reason: HOSPADM

## 2025-03-10 RX ORDER — ALBUTEROL SULFATE 0.83 MG/ML
2.5 SOLUTION RESPIRATORY (INHALATION)
Status: DISCONTINUED | OUTPATIENT
Start: 2025-03-10 | End: 2025-03-11 | Stop reason: HOSPADM

## 2025-03-10 RX ORDER — ALBUTEROL SULFATE 90 UG/1
1-2 INHALANT RESPIRATORY (INHALATION)
Status: DISCONTINUED | OUTPATIENT
Start: 2025-03-10 | End: 2025-03-11 | Stop reason: HOSPADM

## 2025-03-10 RX ORDER — DIPHENHYDRAMINE HYDROCHLORIDE 50 MG/ML
25 INJECTION, SOLUTION INTRAMUSCULAR; INTRAVENOUS
Status: DISCONTINUED | OUTPATIENT
Start: 2025-03-10 | End: 2025-03-11 | Stop reason: HOSPADM

## 2025-03-10 RX ADMIN — IRON SUCROSE 300 MG: 20 INJECTION, SOLUTION INTRAVENOUS at 15:29

## 2025-03-10 RX ADMIN — IBUPROFEN 800 MG: 800 TABLET, FILM COATED ORAL at 05:20

## 2025-03-10 RX ADMIN — ACETAMINOPHEN 650 MG: 325 TABLET ORAL at 01:10

## 2025-03-10 RX ADMIN — ACETAMINOPHEN 650 MG: 325 TABLET ORAL at 05:20

## 2025-03-10 RX ADMIN — GADOBUTROL 6.5 ML: 604.72 INJECTION INTRAVENOUS at 10:54

## 2025-03-10 RX ADMIN — PSYLLIUM HUSK 1 PACKET: 3.4 POWDER ORAL at 08:40

## 2025-03-10 RX ADMIN — IBUPROFEN 800 MG: 800 TABLET, FILM COATED ORAL at 18:32

## 2025-03-10 RX ADMIN — ACETAMINOPHEN 650 MG: 325 TABLET ORAL at 22:06

## 2025-03-10 ASSESSMENT — ACTIVITIES OF DAILY LIVING (ADL)
ADLS_ACUITY_SCORE: 27

## 2025-03-10 NOTE — PROGRESS NOTES
"Vaginal Delivery Postpartum Day 1    Patient Name:  Radha Graves  :      1995  MRN:      [id number]    Assessment:   Day 1 postpartum, NSVB s/p 1st degree laceration and left labial laceration repaired.  S/P prophylactic bilateral mastectomy, bottlefeeding only   seizure-like activity yesterday  Awaiting MRI today  Awaiting EEG today  Anemia from acute blood loss  (Hemoglobin 9.3) asymptomatic other than slightly low BP, appropriate for IV Fe infusion    Plan:    -New mom information started discussed jud care, breast care, pain management, bottle feeding, bowel changes, return of fertility, postpartum exercises, postpartum mood changes and adjustments, postpartum blues vs. postpartum depression, sleep changes, required support.     -MRI planned today  -Neurologist consult pending  -EEG today    -iron infusion offered an accepted.    -Plan for today: encouraged rest, skin-to-skin, breastfeeding on cue, adequate pain control, tub soaks, and limiting visitors.     -Anticipate two day stay with plan for discharge tomorrow.     -Advised follow-up at 2 weeks (optional but recommended) and 6 weeks postpartum for routine clinic visits.    Subjective:  Radha Graves is integrating birth experience; patient had some seizure-like activity yesterday with hospitalist workup.  Neurologist consultation pending, MRI pending. EEG pending  She is up and  independently, is voiding and ambulating without difficulty. No dizziness reported.  Discussed that her hemoglobin dropped from 12.5-9.3 and recommended iron infusion while inpatient.  Pt accepts IV iron infusion after MRI today. Tolerating normal diet.  No BM yet, taking MiraLAX and stool softeners. Voiding without issue. Bleeding is light without clots. Pain is well controlled with current medications of Tylenol and ibuprofen. Baby \"Maya\" is bottle feeding on cue. Postpartum contraception plans include probably OCPs. Support at home identified as father the baby " "and family and friends.  Patient will have 12 weeks off from work \"and then we will see from there\"; partner will have 1-2 weeks off from a job and will work about half of his night shifts.  Recommended that they plan to have someone with the patient at home 24/7 for the first 2 weeks at least and we will know more after the neurology consultation.. She reports no history of depression or anxiety.    Objective:  BP 92/65 (BP Location: Right arm, Cuff Size: Adult Small)   Pulse 88   Temp 97.8  F (36.6  C) (Oral)   Resp 16   Ht 1.626 m (5' 4\")   Wt 65.8 kg (145 lb)   LMP 06/06/2024 (Within Days)   SpO2 97%   Breastfeeding Unknown   BMI 24.89 kg/m    Patient Vitals for the past 24 hrs:   BP Temp Temp src Pulse Resp SpO2   03/10/25 0827 92/65 97.8  F (36.6  C) Oral 88 16 97 %   03/10/25 0515 (!) 88/56 98.1  F (36.7  C) Oral -- 14 97 %   03/10/25 0110 90/53 98.2  F (36.8  C) Oral 75 16 98 %   03/09/25 2110 89/52 97.6  F (36.4  C) Oral 74 16 98 %   03/09/25 1809 112/57 -- -- -- 16 97 %   03/09/25 1700 -- -- -- -- -- 95 %   03/09/25 1543 -- -- -- -- -- 96 %   03/09/25 1538 -- -- -- -- -- 97 %   03/09/25 1533 -- -- -- -- -- 95 %   03/09/25 1528 -- -- -- -- -- 96 %   03/09/25 1523 -- -- -- -- -- 96 %   03/09/25 1518 -- -- -- -- -- 96 %   03/09/25 1513 -- -- -- -- -- 96 %   03/09/25 1508 -- -- -- -- -- 97 %   03/09/25 1503 -- -- -- -- -- 96 %   03/09/25 1458 -- -- -- -- -- 96 %   03/09/25 1453 -- -- -- -- -- 96 %   03/09/25 1448 -- -- -- -- -- 96 %   03/09/25 1446 102/59 -- -- -- -- --   03/09/25 1443 -- -- -- -- -- 96 %   03/09/25 1437 107/67 -- -- -- -- 96 %   03/09/25 1433 -- -- -- -- -- 97 %   03/09/25 1428 -- -- -- -- -- 97 %   03/09/25 1423 -- -- -- -- -- 97 %   03/09/25 1418 -- -- -- -- -- 97 %   03/09/25 1416 107/66 -- -- -- -- --   03/09/25 1413 -- -- -- -- -- 98 %   03/09/25 1408 -- -- -- -- -- 99 %   03/09/25 1403 -- -- -- -- -- 96 %   03/09/25 1401 110/71 -- -- -- -- --   03/09/25 1358 -- -- -- -- -- 98 % "   03/09/25 1353 -- -- -- -- -- 97 %   03/09/25 1348 -- -- -- -- -- 95 %   03/09/25 1346 110/71 -- -- -- -- --   03/09/25 1343 -- -- -- -- -- 97 %   03/09/25 1338 -- -- -- -- -- 96 %   03/09/25 1333 -- -- -- -- -- 95 %   03/09/25 1331 110/64 -- -- -- -- --   03/09/25 1328 -- -- -- -- -- 95 %   03/09/25 1323 -- -- -- -- -- 97 %   03/09/25 1318 -- -- -- -- -- 96 %   03/09/25 1316 104/61 -- -- -- -- --   03/09/25 1313 -- -- -- -- -- 97 %   03/09/25 1308 -- -- -- -- -- 97 %   03/09/25 1303 -- -- -- -- -- 97 %   03/09/25 1301 106/64 -- -- -- -- --   03/09/25 1258 -- -- -- -- -- 96 %   03/09/25 1253 -- -- -- -- -- 96 %   03/09/25 1248 -- -- -- -- -- 99 %   03/09/25 1246 109/62 -- -- -- -- --   03/09/25 1243 -- -- -- -- -- 99 %   03/09/25 1238 -- -- -- -- -- 98 %   03/09/25 1233 -- -- -- -- -- 96 %   03/09/25 1230 103/59 -- -- -- -- 97 %   03/09/25 1227 101/59 -- -- -- -- --   03/09/25 1223 -- -- -- -- -- 99 %   03/09/25 1217 -- 98.9  F (37.2  C) Oral -- -- 100 %   03/09/25 1215 -- -- -- -- -- 97 %   03/09/25 1208 -- -- -- -- -- 99 %   03/09/25 1203 -- -- -- -- -- 97 %   03/09/25 1200 -- -- -- -- -- 100 %   03/09/25 1153 -- -- -- -- -- 97 %   03/09/25 1148 -- -- -- -- -- 100 %   03/09/25 1136 131/68 -- -- -- -- 100 %   03/09/25 1132 -- -- -- -- -- 100 %   03/09/25 1120 -- 99.6  F (37.6  C) Oral -- -- (!) 85 %   03/09/25 1115 95/53 -- -- -- -- --   03/09/25 1100 -- -- -- -- -- 99 %   03/09/25 1057 -- -- -- -- -- 100 %       General Appearance:    Alert, NAD   Breast Exam:   Breasts soft, not breast-feeding so nipples intact.   Abdomen:   Soft, slightly-tender, 3 FB diastasis; fundus firm @ U/U, midline.   Perineum:   Tissues well- approximated, mod edema. Lochia mild, rubra, non-malodorous, without clots.    Legs:     no edema, denies calf tenderness, no varicosities.       Recent labs:  Hemoglobin 9.3 this am, (down from 12.5)    Immunization History   Administered Date(s) Administered    Flu, Unspecified 09/15/2020    TDAP  (Adacel,Boostrix) 01/28/2025       Total time spent on the date of this encounter doing: chart review, review of test results, patient visit, the physical exam & procedure, education, counseling, developing this plan of care, and documenting = 25 min    Provider:  BENNY Bergeron CNM  Date:  3/10/2025  Time:  10:17 AM

## 2025-03-10 NOTE — ANESTHESIA POSTPROCEDURE EVALUATION
Patient: Radha DUCKWORTH Colon    Procedure: * No procedures listed *       Anesthesia Type:  Epidural    Note:  Disposition: Inpatient   Postop Pain Control: Uneventful            Sign Out: Well controlled pain   PONV: No   Neuro/Psych: Uneventful            Sign Out: Acceptable/Baseline neuro status   Airway/Respiratory: Uneventful            Sign Out: Acceptable/Baseline resp. status   CV/Hemodynamics: Uneventful            Sign Out: Acceptable CV status; No obvious hypovolemia; No obvious fluid overload   Other NRE:    DID A NON-ROUTINE EVENT OCCUR?     Event details/Postop Comments:  Routine recovery. No anesthesia concerns. All questions answered. Encouraged to have RN get in touch with anesthesia with any questions or concerns.          Last vitals:  Vitals:    03/10/25 0110 03/10/25 0515 03/10/25 0827   BP: 90/53 (!) 88/56 92/65   Pulse: 75  88   Resp: 16 14 16   Temp: 36.8  C (98.2  F) 36.7  C (98.1  F) 36.6  C (97.8  F)   SpO2: 98% 97% 97%       Electronically Signed By: Leslie Goldberg, MD  March 10, 2025  9:09 AM

## 2025-03-10 NOTE — PROGRESS NOTES
Bedside EEG was performed that included photic, auditory, and sensory stimulation. Hyperventilation was not possible given the patient's clinical state.   Skin intact.  EEG #     [id number] EEG system used.    Neurology dictation to follow up.

## 2025-03-10 NOTE — PROVIDER NOTIFICATION
"Notified Kristen Teran via [facility] page the following:  \"...blood pressures have been soft overnight. 89/52 at 2110, 90/53 at 0110, now 88/56. Other vitals are all stable. She denies dizziness or lightheadedness. Has been voiding. Have been encouraging her to drink plenty of fluids. Scant lochia. Does have a Hgb draw at 0600.\"    Per Kristen Teran, no additional orders at this time, as other vitals are stable. To monitor what AM Hemoglobin result is.  "

## 2025-03-10 NOTE — PLAN OF CARE
VSS, though blood pressures soft. Fundus firm without massage. Scant lochia with no clots. Ambulating with standby assist and seizure pads on side rails due to reported history of seizures.Voiding. Given Tylenol and Ibuprofen for headache and uterine pain. Headache has resolved per patient.      Problem: Adult Inpatient Plan of Care  Goal: Optimal Comfort and Wellbeing  3/10/2025 0304 by Alina Read RN  Outcome: Progressing  3/10/2025 0303 by Alina Read RN  Outcome: Progressing  Intervention: Monitor Pain and Promote Comfort  Recent Flowsheet Documentation  Taken 3/9/2025 2110 by Alina Read RN  Pain Management Interventions: medication (see MAR)  Intervention: Provide Person-Centered Care  Recent Flowsheet Documentation  Taken 3/10/2025 0110 by Alina Read RN  Trust Relationship/Rapport:   care explained   choices provided   emotional support provided   empathic listening provided   questions answered   questions encouraged   reassurance provided   thoughts/feelings acknowledged  Taken 3/9/2025 2110 by Alina Read RN  Trust Relationship/Rapport:   care explained   choices provided   emotional support provided   empathic listening provided   questions answered   questions encouraged   reassurance provided   thoughts/feelings acknowledged     Problem: Adult Inpatient Plan of Care  Goal: Readiness for Transition of Care  3/10/2025 0304 by Alina Read RN  Outcome: Progressing  3/10/2025 0303 by Alina Read RN  Outcome: Progressing     Problem: Postpartum (Vaginal Delivery)  Goal: Optimal Pain Control and Function  Outcome: Progressing  Intervention: Prevent or Manage Pain  Recent Flowsheet Documentation  Taken 3/9/2025 2110 by Alina Read RN  Pain Management Interventions: medication (see MAR)

## 2025-03-10 NOTE — PLAN OF CARE
Problem: Adult Inpatient Plan of Care  Goal: Absence of Hospital-Acquired Illness or Injury  Intervention: Prevent Skin Injury  Recent Flowsheet Documentation  Taken 3/10/2025 1818 by Chelo Carbajal RN  Body Position: position changed independently  Taken 3/10/2025 0827 by Chelo Carbajal RN  Body Position: position changed independently  Goal: Optimal Comfort and Wellbeing  Intervention: Monitor Pain and Promote Comfort  Recent Flowsheet Documentation  Taken 3/10/2025 1818 by Chelo Carbajal RN  Pain Management Interventions:   medication (see MAR)   rest   repositioned   shower  Taken 3/10/2025 0827 by Chelo Carbajal RN  Pain Management Interventions:   repositioned   rest   cold applied  Intervention: Provide Person-Centered Care  Recent Flowsheet Documentation  Taken 3/10/2025 1818 by Chelo Carbajal RN  Trust Relationship/Rapport:   care explained   choices provided   emotional support provided   empathic listening provided   questions answered   questions encouraged   reassurance provided   thoughts/feelings acknowledged  Taken 3/10/2025 0827 by Chelo Carbajal RN  Trust Relationship/Rapport:   care explained   choices provided   emotional support provided   empathic listening provided   questions answered   questions encouraged   reassurance provided   thoughts/feelings acknowledged     Problem: Labor  Goal: Stable Fetal Wellbeing  Intervention: Promote and Monitor Fetal Wellbeing  Recent Flowsheet Documentation  Taken 3/10/2025 1818 by Chelo Carbajal RN  Body Position: position changed independently  Taken 3/10/2025 0827 by Chelo Carbajal RN  Body Position: position changed independently     Problem: Postpartum (Vaginal Delivery)  Goal: Optimal Pain Control and Function  Intervention: Prevent or Manage Pain  Recent Flowsheet Documentation  Taken 3/10/2025 1818 by Chelo Carbajal RN  Pain Management Interventions:   medication (see MAR)   rest   repositioned   shower  Perineal Care: perineum  cleansed  Taken 3/10/2025 0827 by Chelo Carbajal, RN  Pain Management Interventions:   repositioned   rest   cold applied  Perineal Care: perineum cleansed   Goal Outcome Evaluation: patient vitally stable on room air. Pain being managed with PRN ibuprofen, rest, repositioning, ice pack applications, tucks pads, and jud bottle. Patient received iron infusion, tolerated treatment well. Had EEG testing this evening, results pending. Bleeding is light. No clots or odor noted. fundus is firm 1 below. Formula feeding baby every 2-3 hours and on demand. Left PIV intact and saline locked. Bonding well with

## 2025-03-10 NOTE — PROGRESS NOTES
Unable to perform 1630 assessment, d/t patient having EEG testing. Will perform assessment once testing is complete.

## 2025-03-10 NOTE — CONSULTS
"[facility]  Consult Note - Hospitalist Service  Date of Admission:  3/8/2025  Consult Requested by: Monica Turner APRN CNM   Reason for Consult: History of seizure, complaining of headache    Assessment & Plan   Radha Graves is a 29 year old female admitted on 3/8/2025.  Past medical history of bilateral prophylactic mastectomy with reconstruction, unilateral oophorectomy in the setting of strong family history of breast cancer just delivered her baby, medicine service 9consulted for concern of seizure.     Concern for seizure  Concern for migraine  Assessment: Per partner and patient she had a \"seizure-like episode\" right after she had a right arm pain and headaches, no vomiting.  When asked to explain the seizure-like activity, nonspecific description of headache and lightheadedness, no up rolling eyes, no abnormal body movement was noted.  Patient never had a formal neurology evaluation, she did report that she has been having seizure since her childhood, report above 10 episodes in her lifetime.  No clear documented seizure/witnessed seizure from our staff.  Reportedly she had this since the age of 15.  She describes unilateral headache that gets worse whenever she is in area of bright light and sound, and lasts for hours.  Plan:  - If documented seizure, low threshold to Keppra load and put on maintenance dose of Keppra  - MRI in the morning  -Neurology consulted appreciate recs  - Page hospitalist on-call if seizure-like activity  - Consider Ativan if active seizure  - Tylenol, and as needed oxycodone for pain  - Consider sumatriptan if no relief of headache       Clinically Significant Risk Factors Present on Admission        Shanna Garcia MD  Hospitalist Service  Securely message with [employer] (more info)  Text page via [facility] Paging/Directory   ______________________________________________________________________    Chief Complaint   Concern for seizure with history of " "seizure    History is obtained from the patient    History of Present Illness   Radha Graves is a 29 year old female who just delivered her baby, medicine service consulted for concern of seizure.  Patient reported new onset nausea, right arm pain with headache.  Nausea and headache started right after she gave birth while eating her food.  She states her right arm pain is diffuse.  Did receive IV Toradol and Tylenol with minimal relief.  Per partner and patient she had a \"seizure-like episode\" right after she had a right arm pain and headaches, no vomiting.  When asked to explain the seizure-like activity, nonspecific description of headache and lightheadedness, no up rolling eyes, no abnormal body movement was noted.  Patient never had a formal neurology evaluation, she did report that she has been having seizure since her childhood, report above 10 episodes in her lifetime.  No clear documented seizure/witnessed seizure from our staff.  Patient does not plan to breast-feed as she had planned prophylactic bilateral mastectomy with reconstruction.  No fevers or chills, unintentional weight loss, lymphadenopathy, paraesthesias, or weakness in a limb, shortness of breath or wheezing, chest pain or pressure, arthralgias or myalgias, rashes or skin changes, odynophagia or dysphagia, nausea or vomiting, early satiety, abdominal pain, abdominal distension/bloating, diarrhea, constipation, BRBPR, hematochezia, melena, jaundice or scleral icterus      Past Medical History    Past Medical History:   Diagnosis Date    Anemia     Breast disorder 2021    Endometriosis 2008    Fibroadenoma of both breasts 2021    BIRADS 4 - Suspicious. US-Guided Biopsy of the Right Breast Mass at 11:00 6cmFN on 09/24/2020 revealed benign results of fibroadenoma without atypia or malignancy. Genetic Testing with [email or web address] 84-gene panel 10/13/2020 was Negative for pathogenic mutations.    Ovarian cancer, left (H) 2010    Oopherectomy   Stage 1 "       Past Surgical History   Past Surgical History:   Procedure Laterality Date    MASTECTOMY, RECONSTRUCT BREAST, COMBINED Bilateral     [id number]    oopherectomy Right 2010       Medications   I have reviewed this patient's current medications       Review of Systems    The 10 point Review of Systems is negative other than noted in the HPI or here.      Physical Exam   Vital Signs: Temp: 97.6  F (36.4  C) Temp src: Oral BP: 89/52 Pulse: 74   Resp: 16 SpO2: 98 % O2 Device: None (Room air)    Weight: 145 lbs 0 oz    General Appearance: Not in distress  Respiratory: Clear to auscultation bilaterally, no added breath sounds  Cardiovascular: S1 and S2 well heard, no murmur or gallop  GI: Nontender, nondistended, positive bowel sounds  Skin: No rash  CNS: Alert and oriented x 3, nonfocal      Medical Decision Making       75 MINUTES SPENT BY ME on the date of service doing chart review, history, exam, documentation & further activities per the note.      Data     I have personally reviewed the following data over the past 24 hrs:    9.5  \   12.5   / 167     N/A N/A N/A /  N/A   N/A N/A N/A \       Imaging results reviewed over the past 24 hrs:   No results found for this or any previous visit (from the past 24 hours).

## 2025-03-10 NOTE — PROGRESS NOTES
"[facility]    Medicine Progress Note - Hospitalist Service    Date of Admission:  3/8/2025    Assessment & Plan   Radha Graves is a 28 yo female admitted on 3/8/2025 for normal spontaneous vaginal delivery. Past medical history significant for anemia, endometriosis, bilateral prophylactic mastectomy with reconstruction, unilateral oophorectomy for stage 1 ovarian cancer. Hospitalist service was consulted for concern of seizure/migraine which occurred shortly after patient gave birth yesterday (3/9). Patient reported a \"seizure-like episode\" which included headache, right arm pain, nausea, and lightheadedness. This episode was not witnessed by nursing staff, was witnessed by patient and partner. Patient states she has had a history of similar episodes since she was 16 yo, but has never had a formal neuro workup. She denies any history of migraine. Today she is feeling overall well, rates headache at 2/10 compared to yesterday which was 10/10. Has been given tylenol, ibuprofen, and Toradol for pain. She denies any nausea, arm pain, lightheadedness.  Blood pressures have been soft between 88/56 and 112/57 in the past 24 hours. States she \"takes things slow\" when she gets up to walk around. Of note her hemoglobin this morning was 9.3, down from 12.5 yesterday. She did lose 912cc of blood during birth.     Patient reports she has had roughly 10 of these episodes since she was 15 years of age.  Usually they start with right sided headache which spreads to the middle of the right side of her face and then sometimes she will have associated nausea.  At times she will feel pain down her right arm and occasionally will have some shaking of her right leg and a couple times shaking of her right arm.  Usually quite short-lived a couple of minutes to maybe 15 minutes.  She has never sought medical attention for this in the past.    Concern for seizure  Concern for migraine  Assessment:   Patient denies " any symptoms today, reports improving headache rated at 2/10, well controlled with ibuprofen, tylenol, and Toradol.   Given soft blood pressures, concern for eclampsia is low.  Plan:   MRI brain w/o & w contrast completed  Consulted neurology, who ordered EEG. EEG pending. If EEG is normal, will go forward with outpatient workup with neurology.  Page hospitalist on-call if seizure-like activity  If documented seizure, low threshold to Keppra load and maintenance dose  Consider Ativan if active seizure  Tylenol, ibuprofen, toradol, and as needed oxycodone for pain  Consider sumatriptan if no relief of headache        Acute blood loss anemia.  Hemoglobin 12.5 prior to delivery and then 9.3 this morning  She is asymptomatic with this  Blood pressures have been running mildly low this morning but heart rate was normal this afternoon blood pressure is 104/70 with heart rate of 109  No significant vaginal bleeding noted at this point      Diet: Room Service  Regular Diet Adult    DVT Prophylaxis: Pneumatic Compression Devices and Ambulate every shift  Calle Catheter: Not present  Lines: None     Cardiac Monitoring: None  Code Status: Full Code          Disposition Plan     Medically Ready for Discharge: Anticipated Tomorrow           The patient's care was discussed with the Attending Physician, Dr. Juve Farah .    Juve Farah MD  Hospitalist Service  [facility]  Securely message with [employer] (more info)  Text page via [employer] Paging/Directory     Patient independently seen and examined  Patient discussed with physician assistant student  Collaborated on history, exam, assessment, plan and documentation as noted above    Juve Farah MD  [facility] Medicine Service  ______________________________________________________________________    Interval History   Blood pressures have been soft overnight, ranging from 88-90 systolic and 52-56 diastolic. Hgb this morning was 9.3,  down from 12.5 yesterday. All other vital signs are stable. Patient has been ambulating without complication. Patient denies dizziness, lightheadedness, arm pain, trouble breathing. Has been voiding, no BM since before birth.     Physical Exam   Vital Signs: Temp: 99.4  F (37.4  C) Temp src: Oral BP: 104/70 Pulse: 109   Resp: 18 SpO2: 98 % O2 Device: None (Room air)    Weight: 145 lbs 0 oz    Constitutional: awake, alert, cooperative, no apparent distress, and appears stated age  ENT: normocephalic, without obvious abnormality  Respiratory: No increased work of breathing, good air exchange, clear to auscultation bilaterally, no crackles or wheezing  Cardiovascular: Regular rate and rhythm, and no murmur noted  GI: Normal bowel sounds, fundus firm  Skin: no rashes and no lesions  Neurologic: Alert and appears cognitively intact.  Cranial nerves II through XII intact.  PERRL.  Moves all 4 extremities well.  No tremor.

## 2025-03-11 VITALS
DIASTOLIC BLOOD PRESSURE: 62 MMHG | TEMPERATURE: 98.5 F | RESPIRATION RATE: 16 BRPM | HEIGHT: 64 IN | HEART RATE: 90 BPM | BODY MASS INDEX: 24.75 KG/M2 | SYSTOLIC BLOOD PRESSURE: 92 MMHG | WEIGHT: 145 LBS | OXYGEN SATURATION: 98 %

## 2025-03-11 PROCEDURE — 99207 PR SUBSEQUENT HOSPITAL CARE FOR MOTHER, 15 MINUTES: CPT | Performed by: ADVANCED PRACTICE MIDWIFE

## 2025-03-11 PROCEDURE — 99231 SBSQ HOSP IP/OBS SF/LOW 25: CPT | Performed by: FAMILY MEDICINE

## 2025-03-11 PROCEDURE — 250N000013 HC RX MED GY IP 250 OP 250 PS 637

## 2025-03-11 RX ORDER — IBUPROFEN 800 MG/1
800 TABLET, FILM COATED ORAL EVERY 6 HOURS PRN
COMMUNITY
Start: 2025-03-11

## 2025-03-11 RX ORDER — ACETAMINOPHEN 325 MG/1
650 TABLET ORAL EVERY 4 HOURS PRN
COMMUNITY
Start: 2025-03-11

## 2025-03-11 RX ADMIN — IBUPROFEN 800 MG: 800 TABLET, FILM COATED ORAL at 11:05

## 2025-03-11 RX ADMIN — IBUPROFEN 800 MG: 800 TABLET, FILM COATED ORAL at 04:59

## 2025-03-11 RX ADMIN — PSYLLIUM HUSK 1 PACKET: 3.4 POWDER ORAL at 08:32

## 2025-03-11 RX ADMIN — ACETAMINOPHEN 650 MG: 325 TABLET ORAL at 11:05

## 2025-03-11 RX ADMIN — ACETAMINOPHEN 650 MG: 325 TABLET ORAL at 04:59

## 2025-03-11 ASSESSMENT — ACTIVITIES OF DAILY LIVING (ADL)
ADLS_ACUITY_SCORE: 27
ADLS_ACUITY_SCORE: 26
ADLS_ACUITY_SCORE: 27
ADLS_ACUITY_SCORE: 26
ADLS_ACUITY_SCORE: 27
ADLS_ACUITY_SCORE: 27

## 2025-03-11 NOTE — PLAN OF CARE
Goal Outcome Evaluation:             VSS.  Denies seizure symptoms this evening. Taking tylenol and ibuprofen for pain. Fundus firm and lochia WDL. Bonding well with . Significant other in room, supportive.            Problem: Adult Inpatient Plan of Care  Goal: Optimal Comfort and Wellbeing  Outcome: Progressing     Problem: Postpartum (Vaginal Delivery)  Goal: Successful Parent Role Transition  Outcome: Progressing  Goal: Optimal Pain Control and Function  Outcome: Progressing

## 2025-03-11 NOTE — PLAN OF CARE
Problem: Adult Inpatient Plan of Care  Goal: Plan of Care Review  Description: The Plan of Care Review/Shift note should be completed every shift.  The Outcome Evaluation is a brief statement about your assessment that the patient is improving, declining, or no change.  This information will be displayed automatically on your shift  note.  Outcome: Met  Flowsheets (Taken 3/11/2025 3703)  Plan of Care Reviewed With:   patient   spouse     Problem: Postpartum (Vaginal Delivery)  Goal: Optimal Pain Control and Function  Outcome: Met     Goal Outcome Evaluation:      Plan of Care Reviewed With: patient, spouse    Discharge instructions given to patient and spouse. Patient verbalized that she has an appointment next week with her neurologist.  Patient verbalized understanding of instructions.

## 2025-03-11 NOTE — PROGRESS NOTES
"Vaginal Delivery Postpartum Day 2 and Discharge      Patient Name:  Radha Graves  :      1995  MRN:      [id number]      Subjective:  Radha is feeling relatively well.  Voiding and ambulating without difficulty. Tolerating a normal diet. No BM yet but taking Metamucil.  Bleeding is decreasing.  Pain is manageable if takes her tylenol/ibuprofen on a schedule - got \"behind\" at one point last night and was very uncomfortable.  Plans to take the meds on a schedule over the next few days.  Baby is formula feeding.  Postpartum contraception plans are still being decided (plans to talk more with CNMs at 2 and 6 weeks postpartum). Support at home identified partner Seth and his mom.  They also have a community of friends.   Will be off work X3 months.  Seth also has 3 months, but the couple may take their time off in a staggered fashion so baby is at home with one of them longer.  Has no history of depression/anxiety/mental health disorder.       Objective:  BP 92/62 (BP Location: Left arm, Patient Position: Semi-Arias's, Cuff Size: Adult Small)   Pulse 90   Temp 98.5  F (36.9  C) (Oral)   Resp 16   Ht 1.626 m (5' 4\")   Wt 65.8 kg (145 lb)   LMP 2024 (Within Days)   SpO2 98%   Breastfeeding Unknown   BMI 24.89 kg/m      General:  Pleasant, articulate, well-nourished female.  Not in any apparent distress.  Breasts:  No exam - history of bilateral mastectomy  Abdomen:  Soft, non-tender.  Fundus firm @ U - 2 to -3, non-tender, midline.  Bleeding:  Minimal rubra lochia, without clots or odor.  Vulva: Laceration well approximated.  No edema.  No bruising.  Rectal:  No hemorrhoids.  Legs:  No edema.  No varicosities.  Negative catina's sign.    Other:  Hasn't had any seizure or seizure-like activity, headaches, arm/one sided pain, etc since the single incident shortly after her birth.    Recent labs:  Hemoglobin 9.3 yesterday 3/10 at 0715 (had been 12.5 on 3/8).      Assessment:   Postpartum Day 2, " "vaginal birth.  Stable from OB/GYN perspective.  Anemia due to acute blood loss during birth ().  Most recent HGB 9.3.  Asymptomatic of anemia.  Bottle feeding formula - history of bilateral mastectomy , and reconstruction in .  History of periodic Neurologic Episodes.  Have been happening since age 15, with one episode during this hospitalization on 3/9 while newly postpartum (happened ~2.5 hours post birth).  Uncertain diagnosis.  Per patient report has both seizure-like or migraine-like symptoms when the episodes happen.  Has been in the care of the [facility] Hospitalist team for this condition while inpatient.  Hospitality has determined Radha is stable for discharge.  Planned outpatient follow-up.      Plan:    Routine postpartum education completed. Discussed jud care, pain management, bowel changes, return of fertility, postpartum exercises, postpartum mood changes and adjustments, postpartum \"baby-blues\" vs.  mood and anxiety disorders, sleep changes, required support.   Will take po iron/Vitamin C/Vitamin B12 supplementation postpartum due to anemia from acute blood loss during birth.  Follow up:  Postpartum follow up - clinic visits with CNMs at 2 weeks and 6 weeks in clinic (declines home visit with RN)  Neurology follow up -  Please see Hospitalist Note for details of patient's planned neurologic follow-up.  Plan for today: encouraged rest, skin-to-skin, adequate pain control, tub soaks, and naps. Discharging to home today.  Will be accompanied by her partner/family        Provider:  BENNY Wren/LUIS    Date:  3/11/2025  Time:  9:37 AM              "

## 2025-03-11 NOTE — PLAN OF CARE
Baby formula feeding on demand every 2-3 hours. PRN ibuprofen and tylenol given for pain. Longer gap in pain medication overnight, patient encouraged to stay on schedule for better pain coverage. Up independently, voiding without difficulty. Postpartum assessment WNL, see flowsheets for more information. S/O at bedside, supportive. Caregiver education and support on-going.    Jenelle Rojas RN      Problem: Adult Inpatient Plan of Care  Goal: Plan of Care Review  Outcome: Progressing  Flowsheets (Taken 3/11/2025 0500)  Plan of Care Reviewed With: patient  Overall Patient Progress: improving  Goal: Patient-Specific Goal (Individualized)  Outcome: Progressing  Flowsheets (Taken 3/11/2025 0500)  Patient/Family-Specific Goals (Include Timeframe): rest, pain control, bond with baby  Goal: Absence of Hospital-Acquired Illness or Injury  Outcome: Progressing  Intervention: Prevent Skin Injury  Recent Flowsheet Documentation  Taken 3/11/2025 0500 by Jenelle Rojas RN  Body Position: position changed independently  Goal: Optimal Comfort and Wellbeing  Outcome: Progressing  Intervention: Monitor Pain and Promote Comfort  Recent Flowsheet Documentation  Taken 3/11/2025 0500 by Jenelle Rojas RN  Pain Management Interventions: medication (see MAR)  Intervention: Provide Person-Centered Care  Recent Flowsheet Documentation  Taken 3/11/2025 0500 by Jenelle Rojas RN  Trust Relationship/Rapport:   care explained   choices provided   emotional support provided   empathic listening provided   questions answered   questions encouraged   reassurance provided   thoughts/feelings acknowledged  Goal: Readiness for Transition of Care  Outcome: Progressing     Problem: Postpartum (Vaginal Delivery)  Goal: Successful Parent Role Transition  Outcome: Progressing  Goal: Hemostasis  Outcome: Progressing  Goal: Absence of Infection Signs and Symptoms  Outcome: Progressing  Goal: Anesthesia/Sedation  Recovery  Outcome: Progressing  Goal: Optimal Pain Control and Function  Outcome: Progressing  Intervention: Prevent or Manage Pain  Recent Flowsheet Documentation  Taken 3/11/2025 0500 by Jenelle Rojas RN  Pain Management Interventions: medication (see MAR)  Goal: Effective Urinary Elimination  Outcome: Progressing       Goal Outcome Evaluation:      Plan of Care Reviewed With: patient    Overall Patient Progress: improvingOverall Patient Progress: improving

## 2025-03-11 NOTE — PROGRESS NOTES
[facility]    Progress Note - Hospitalist Service       Date of Admission:  3/8/2025    Assessment & Plan   Radha Graves is a 29 year old female admitted on 3/8/2025 for normal spontaneous vaginal delivery.  Past medical history of bilateral prophylactic mastectomy with reconstruction, unilateral oophorectomy for stage I ovarian cancer, vaginal delivery on 3/9/2025, medicine service consulted for concern of seizure-like activity.  Patient reports the symptoms have since resolved.  Neurology has also been consulted who recommended brain MRI and EEG.  Both are normal, recommend to consider outpatient follow-up with neurology.     Concern for seizure   Concern for atypical migraine  Appreciate neurology recs  EEG was completed last night per neurology request. Results normal  Consider outpatient follow-up with neurology. Follow up with PCP to discuss.    Acute blood loss anemia   Hgb was 12.5 prior to delivery, 9.3 yesterday morning. No value today.   She is asymptomatic.  Blood pressures have been soft at 92/62 this morning, HR normal at 80bpm. Patient reports chronically low blood pressures with systolic values in 80s-90s since she was young.  No significant vaginal bleeding.         The patient's care was discussed with the Attending Physician, Dr. Juve Farah .    Silvina Sharma  Hospitalist Service  [facility]  Securely message with [employer] (more info)  Text page via [employer] Paging/Directory     Patient independently seen and examined  Patient discussed with physician assistant student  Collaborated on history, exam, assessment, plan and documentation as noted above     Juve Farah MD  [facility] Medicine Service  ______________________________________________________________________    Interval History   Radha Graves is a 29-year-old female who is admitted for spontaneous vaginal delivery, hospitalist team was consulted for concern of  seizure-like activity following birth which patient reported on 3/9.  Neurology consulted, brain MRI and EEG were done yesterday.  Patient reports that the EEG caused her to get a migraine which was controlled with ibuprofen and Tylenol.  Patient denies any seizure-like symptoms yesterday or today.  She also had acute blood loss anemia yesterday with a hemoglobin of 9.3, no repeat hemoglobin was ordered for today but patient remains asymptomatic.  Denies chest pain, trouble breathing, dizziness, reports only mild nausea and very mild headache which is well-controlled with as needed Tylenol and ibuprofen.  She is hoping to go home today.    Physical Exam   Vital Signs: Temp: 98.5  F (36.9  C) Temp src: Oral BP: 92/62 Pulse: 90   Resp: 16 SpO2: 98 % O2 Device: None (Room air)    Weight: 145 lbs 0 oz    Constitutional: awake, alert, cooperative, no apparent distress, and appears stated age  Respiratory: No increased work of breathing, good air exchange, clear to auscultation bilaterally, no crackles or wheezing  Cardiovascular: Regular rate and rhythm, normal S1 and S2, no S3 or S4, and no murmur noted  Skin: normal skin color, texture, turgor  Musculoskeletal: no lower extremity pitting edema present  Neurologic: Awake, alert, oriented to name, place and time.      Medical Decision Making     Recent Results (from the past 24 hours)   MR Brain w/o & w Contrast    Narrative    EXAM: MR BRAIN W/O and W CONTRAST  LOCATION: [facility]  DATE: 3/10/2025    INDICATION: Patient with migraine headache and concern for seizure, to rule out structural problems  COMPARISON: None.  CONTRAST: 6.5 mL Gadavist.  TECHNIQUE: Routine multiplanar multisequence head MRI without and with intravenous contrast.    FINDINGS:  INTRACRANIAL CONTENTS: No acute or subacute infarct. No mass, acute hemorrhage, or extra-axial fluid collections. There is a nonspecific nonenhancing 6 mm T2-hyperintense signal abnormality of the  left parietal corona radiata (image 18 of series 8). The   brain parenchyma is otherwise unremarkable in signal. Normal ventricles and sulci. Normal position of the cerebellar tonsils. No pathologic contrast enhancement. The major intracranial flow voids are unremarkable.    SELLA: No abnormality accounting for technique.    OSSEOUS STRUCTURES/SOFT TISSUES: Unremarkable marrow signal. Unremarkable extracranial soft tissues. Limited images of the upper cervical spine demonstrate no acute abnormality.      ORBITS: No abnormality accounting for technique.     SINUSES/MASTOIDS: Mucosal thickening primarily involving the ethmoid air cells. No middle ear or mastoid effusion.       Impression    IMPRESSION:  1.  No evidence of an acute intracranial abnormality.  2.  Nonenhancing subcentimeter signal abnormality of the left parietal white matter. This is very nonspecific and could reflect nondescript gliosis.

## 2025-03-11 NOTE — DISCHARGE INSTRUCTIONS
Warning Signs after Having a Baby    Keep this paper on your fridge or somewhere else where you can see it.    Call your provider if you have any of these symptoms up to 12 weeks after having your baby.    Thoughts of hurting yourself or your baby  Pain in your chest or trouble breathing  Severe headache not helped by pain medicine  Eyesight concerns (blurry vision, seeing spots or flashes of light, other changes to eyesight)  Fainting, shaking or other signs of a seizure    Call 9-1-1 if you feel that it is an emergency.     The symptoms below can happen to anyone after giving birth. They can be very serious. Call your provider if you have any of these warning signs.    My provider s phone number: _______________________    Losing too much blood (hemorrhage)    Call your provider if you soak through a pad in less than an hour or pass blood clots bigger than a golf ball. These may be signs that you are bleeding too much.    Blood clots in the legs or lungs    After you give birth, your body naturally clots its blood to help prevent blood loss. Sometimes this increased clotting can happen in other areas of the body, like the legs or lungs. This can block your blood flow and be very dangerous.     Call your provider if you:  Have a red, swollen spot on the back of your leg that is warm or painful when you touch it.   Are coughing up blood.     Infection    Call your provider if you have any of these symptoms:  Fever of 100.4 F (38 C) or higher.  Pain or redness around your stitches if you had an incision.   Any yellow, white, or green fluid coming from places where you had stitches or surgery.    Mood Problems (postpartum depression)    Many people feel sad or have mood changes after having a baby. But for some people, these mood swings are worse.     Call your provider right away if you feel so anxious or nervous that you can't care for yourself or your baby.    Preeclampsia (high blood pressure)    Even if you  didn't have high blood pressure when you were pregnant, you are at risk for the high blood pressure disease called preeclampsia. This risk can last up to 12 weeks after giving birth.     Call your provider if you have:   Pain on your right side under your rib cage  Sudden swelling in the hands and face    Remember: You know your body. If something doesn't feel right, get medical help.     For informational purposes only. Not to replace the advice of your health care provider. Copyright 2020 [facility]. All rights reserved. Clinically reviewed by Jessica Odonnell, RNC-OB, MSN. [employer] [id number] - Rev 02/23.

## 2025-03-11 NOTE — PROGRESS NOTES
Outreach Note for CARMEN Graves  [id number]  1995    Chart reviewed, discharge follow-up plan discussed with patient, needs assessed. Patient requests all follow-up through clinic/physician. Patient declines home care visit. Post Partum follow-up appointment  patient will schedule as instructed. Patient states she has good support at home, has baby care essentials, and feels ready to discharge today.    Outreach RN will continue to follow and assist if needed with discharge plan. No further needs identified at this time.    Completed by: Kim Seipel RN

## 2025-03-12 ENCOUNTER — MYC MEDICAL ADVICE (OUTPATIENT)
Dept: OBGYN | Facility: CLINIC | Age: 30
End: 2025-03-12
Payer: COMMERCIAL

## 2025-03-12 ENCOUNTER — TELEPHONE (OUTPATIENT)
Dept: MIDWIFE SERVICES | Facility: CLINIC | Age: 30
End: 2025-03-12
Payer: COMMERCIAL

## 2025-03-12 NOTE — LETTER
2025      Radha Graves  [address] [place] [address]        To Whom It May Concern:    Radha Graves is a patient of our clinic during her pregnancy.  Please excuse her partner, Lynda Darling ( 10/09/1991) from work on 3/10/25 and 3/11/25 due to him being present and caring for Radha during her delivery and hospitalization.     If you have any questions, please reach out to our clinic at [phone].          Sincerely,        Mariposa Wan CNM    Electronically signed

## 2025-03-12 NOTE — TELEPHONE ENCOUNTER
Good evening,      I am reaching out as I delivered my baby Tello morning and am needing a documentation for my partner to turn in to work for Monday and Tuesday that we stayed in the hospital and excusing him from work. Would that be possible to be emailed or attached to my chart?     Looks like pt was being seen at [facility] but then maybe transferred to [facility]?  Pended letter for approval  Nazanin Lyons RN on 3/12/2025 at 12:17 PM

## 2025-03-12 NOTE — TELEPHONE ENCOUNTER
Letter request: completed a excused from work letter for pt's partner present during inpatient stay for Labor and Delivery. Pt notified by JUDY and Altaf.

## 2025-03-13 ENCOUNTER — PATIENT OUTREACH (OUTPATIENT)
Dept: CARE COORDINATION | Facility: CLINIC | Age: 30
End: 2025-03-13
Payer: COMMERCIAL

## 2025-03-13 NOTE — PROGRESS NOTES
Connected Care Resource Center:   [facility] Contact  [facility]/Voicemail     Clinical Data: Post-Discharge Outreach     Outreach attempted x 2.  Left message on patient's voicemail, providing [facility]'s central phone number of [phone] ([phone]) for questions/concerns and/or to schedule an appt with an [facility] provider, if they do not have a PCP.      Plan:  [facility] will do no further outreaches at this time.       MELODY Hewitt  Connected Care Resource [place], [facility]    *Connected Care Resource Team does NOT follow patient ongoing. Referrals are identified based on internal discharge reports and the outreach is to ensure patient has an understanding of their discharge instructions.

## 2025-03-26 ASSESSMENT — EDINBURGH POSTNATAL DEPRESSION SCALE (EPDS)
I HAVE BEEN SO UNHAPPY THAT I HAVE BEEN CRYING: ONLY OCCASIONALLY
THE THOUGHT OF HARMING MYSELF HAS OCCURRED TO ME: NEVER
I HAVE BEEN SO UNHAPPY THAT I HAVE HAD DIFFICULTY SLEEPING: NOT AT ALL
I HAVE BEEN ANXIOUS OR WORRIED FOR NO GOOD REASON: HARDLY EVER
THINGS HAVE BEEN GETTING ON TOP OF ME: YES, SOMETIMES I HAVEN'T BEEN COPING AS WELL AS USUAL
I HAVE FELT SAD OR MISERABLE: NOT VERY OFTEN
I HAVE FELT SAD OR MISERABLE: NOT VERY OFTEN
I HAVE FELT SCARED OR PANICKY FOR NO GOOD REASON: NO, NOT AT ALL
I HAVE FELT SCARED OR PANICKY FOR NO GOOD REASON: NO, NOT AT ALL
I HAVE BLAMED MYSELF UNNECESSARILY WHEN THINGS WENT WRONG: NOT VERY OFTEN
I HAVE BEEN SO UNHAPPY THAT I HAVE BEEN CRYING: ONLY OCCASIONALLY
THE THOUGHT OF HARMING MYSELF HAS OCCURRED TO ME: NEVER
I HAVE BLAMED MYSELF UNNECESSARILY WHEN THINGS WENT WRONG: NOT VERY OFTEN
I HAVE BEEN ANXIOUS OR WORRIED FOR NO GOOD REASON: HARDLY EVER
I HAVE BEEN SO UNHAPPY THAT I HAVE HAD DIFFICULTY SLEEPING: NOT AT ALL
THINGS HAVE BEEN GETTING ON TOP OF ME: YES, SOMETIMES I HAVEN'T BEEN COPING AS WELL AS USUAL
I HAVE BEEN ABLE TO LAUGH AND SEE THE FUNNY SIDE OF THINGS: AS MUCH AS I ALWAYS COULD

## 2025-03-27 ENCOUNTER — PRENATAL OFFICE VISIT (OUTPATIENT)
Dept: MIDWIFE SERVICES | Facility: CLINIC | Age: 30
End: 2025-03-27
Attending: ADVANCED PRACTICE MIDWIFE
Payer: COMMERCIAL

## 2025-03-27 VITALS
HEART RATE: 80 BPM | DIASTOLIC BLOOD PRESSURE: 60 MMHG | HEIGHT: 64 IN | WEIGHT: 116.6 LBS | BODY MASS INDEX: 19.91 KG/M2 | SYSTOLIC BLOOD PRESSURE: 80 MMHG

## 2025-03-27 DIAGNOSIS — Z30.011 BCP (BIRTH CONTROL PILLS) INITIATION: Primary | ICD-10-CM

## 2025-03-27 DIAGNOSIS — R56.9 SEIZURE-LIKE ACTIVITY (H): ICD-10-CM

## 2025-03-27 RX ORDER — LEVONORGESTREL/ETHIN.ESTRADIOL 0.1-0.02MG
1 TABLET ORAL DAILY
Qty: 84 TABLET | Refills: 3 | Status: SHIPPED | OUTPATIENT
Start: 2025-03-27

## 2025-03-27 NOTE — PROGRESS NOTES
SUBJECTIVE:  is here for a 2-week postpartum checkup.    Delivery date was 3/9/25. She had a  of a viable girl with no complications.  Since delivery, she has not been breast feeding.  She has No signs of infection, bleeding or other complications.    We discussed contraception and she has chosen oral contraceptives.  She wants to start pills ASAP due to history of endometriosis..  She  has not had intercourse since delivery and complains of No discomfort. Patient screened for postpartum depression and complaints are positive for some anxiety and depression.  She says that she is well supported by her partner.  She reports it as ups and downs.  She declines any referrals. Screening has also been completed for intimate partner violence.  She is accompanied by her baby girl who she is caring for lovingly.  She never saw Neuro due to history of seizure like activity.      EXAM:  GENERAL healthy, alert, no distress, and cooperative    ASSESSMENT/Plan  Normal postpartum exam after .    (Z30.011) BCP (birth control pills) initiation  (primary encounter diagnosis)  Plan: levonorgestrel-ethinyl estradiol (AVIANE)         0.1-20 MG-MCG tablet  Discussed risks and benefits, common side effects, warning signs, how to start.  Please get education from Pharmacist at first .      (Z39.2) Routine postpartum follow-up  Plan: Return to clinic  at 6 week Postpartum.  Call with any concerns about mood.      (R56.9) Seizure-like activity (H)  Plan: Please see Neurology as discussed previously - we discussed safety for her and her baby.      Anjali Snowden, JUAN PABLO, APRN, CNM

## 2025-04-14 ENCOUNTER — MEDICAL CORRESPONDENCE (OUTPATIENT)
Dept: HEALTH INFORMATION MANAGEMENT | Facility: CLINIC | Age: 30
End: 2025-04-14
Payer: COMMERCIAL

## 2025-04-21 ENCOUNTER — APPOINTMENT (OUTPATIENT)
Dept: ULTRASOUND IMAGING | Facility: CLINIC | Age: 30
End: 2025-04-21
Attending: PHYSICIAN ASSISTANT
Payer: COMMERCIAL

## 2025-04-21 ENCOUNTER — HOSPITAL ENCOUNTER (EMERGENCY)
Facility: CLINIC | Age: 30
Discharge: HOME | End: 2025-04-21
Attending: EMERGENCY MEDICINE | Admitting: EMERGENCY MEDICINE
Payer: COMMERCIAL

## 2025-04-21 ENCOUNTER — PRENATAL OFFICE VISIT (OUTPATIENT)
Dept: MIDWIFE SERVICES | Facility: CLINIC | Age: 30
End: 2025-04-21
Payer: COMMERCIAL

## 2025-04-21 VITALS
HEIGHT: 64 IN | BODY MASS INDEX: 19.29 KG/M2 | DIASTOLIC BLOOD PRESSURE: 63 MMHG | RESPIRATION RATE: 27 BRPM | HEART RATE: 87 BPM | WEIGHT: 113 LBS | TEMPERATURE: 98.1 F | SYSTOLIC BLOOD PRESSURE: 102 MMHG | OXYGEN SATURATION: 100 %

## 2025-04-21 VITALS
HEIGHT: 64 IN | OXYGEN SATURATION: 97 % | BODY MASS INDEX: 19.38 KG/M2 | TEMPERATURE: 98.1 F | DIASTOLIC BLOOD PRESSURE: 70 MMHG | SYSTOLIC BLOOD PRESSURE: 98 MMHG | WEIGHT: 113.5 LBS | HEART RATE: 62 BPM

## 2025-04-21 DIAGNOSIS — R42 DIZZINESS: ICD-10-CM

## 2025-04-21 DIAGNOSIS — R10.84 GENERALIZED ABDOMINAL PAIN: Primary | ICD-10-CM

## 2025-04-21 DIAGNOSIS — R11.2 NAUSEA AND VOMITING, UNSPECIFIED VOMITING TYPE: ICD-10-CM

## 2025-04-21 DIAGNOSIS — R10.84 GENERALIZED ABDOMINAL PAIN: ICD-10-CM

## 2025-04-21 DIAGNOSIS — N93.9 VAGINAL BLEEDING: ICD-10-CM

## 2025-04-21 PROBLEM — Z34.03 ENCOUNTER FOR SUPERVISION OF NORMAL FIRST PREGNANCY IN THIRD TRIMESTER: Status: RESOLVED | Noted: 2024-09-10 | Resolved: 2025-04-21

## 2025-04-21 LAB
ALBUMIN SERPL BCG-MCNC: 4.4 G/DL (ref 3.5–5.2)
ALBUMIN UR-MCNC: 50 MG/DL
ALP SERPL-CCNC: 67 U/L (ref 40–150)
ALT SERPL W P-5'-P-CCNC: 6 U/L (ref 0–50)
ANION GAP SERPL CALCULATED.3IONS-SCNC: 12 MMOL/L (ref 7–15)
APPEARANCE UR: ABNORMAL
AST SERPL W P-5'-P-CCNC: 14 U/L (ref 0–45)
ATRIAL RATE - MUSE: 68 BPM
BASOPHILS # BLD AUTO: 0 10E3/UL (ref 0–0.2)
BASOPHILS NFR BLD AUTO: 0 %
BILIRUB SERPL-MCNC: 0.4 MG/DL
BILIRUB UR QL STRIP: NEGATIVE
BUN SERPL-MCNC: 13.2 MG/DL (ref 6–20)
CALCIUM SERPL-MCNC: 9 MG/DL (ref 8.8–10.4)
CHLORIDE SERPL-SCNC: 106 MMOL/L (ref 98–107)
COLOR UR AUTO: ABNORMAL
CREAT SERPL-MCNC: 0.64 MG/DL (ref 0.51–0.95)
CRP SERPL-MCNC: <3 MG/L
DIASTOLIC BLOOD PRESSURE - MUSE: 71 MMHG
EGFRCR SERPLBLD CKD-EPI 2021: >90 ML/MIN/1.73M2
EOSINOPHIL # BLD AUTO: 0 10E3/UL (ref 0–0.7)
EOSINOPHIL NFR BLD AUTO: 0 %
ERYTHROCYTE [DISTWIDTH] IN BLOOD BY AUTOMATED COUNT: 16.1 % (ref 10–15)
GLUCOSE SERPL-MCNC: 83 MG/DL (ref 70–99)
GLUCOSE UR STRIP-MCNC: NEGATIVE MG/DL
HCO3 SERPL-SCNC: 22 MMOL/L (ref 22–29)
HCT VFR BLD AUTO: 38.2 % (ref 35–47)
HGB BLD-MCNC: 12.4 G/DL (ref 11.7–15.7)
HGB UR QL STRIP: ABNORMAL
IMM GRANULOCYTES # BLD: 0 10E3/UL
IMM GRANULOCYTES NFR BLD: 0 %
INTERPRETATION ECG - MUSE: NORMAL
KETONES UR STRIP-MCNC: 10 MG/DL
LEUKOCYTE ESTERASE UR QL STRIP: ABNORMAL
LIPASE SERPL-CCNC: 21 U/L (ref 13–60)
LYMPHOCYTES # BLD AUTO: 1.2 10E3/UL (ref 0.8–5.3)
LYMPHOCYTES NFR BLD AUTO: 11 %
MAGNESIUM SERPL-MCNC: 2.1 MG/DL (ref 1.7–2.3)
MCH RBC QN AUTO: 26.5 PG (ref 26.5–33)
MCHC RBC AUTO-ENTMCNC: 32.5 G/DL (ref 31.5–36.5)
MCV RBC AUTO: 82 FL (ref 78–100)
MONOCYTES # BLD AUTO: 0.3 10E3/UL (ref 0–1.3)
MONOCYTES NFR BLD AUTO: 3 %
MUCOUS THREADS #/AREA URNS LPF: PRESENT /LPF
NEUTROPHILS # BLD AUTO: 9.3 10E3/UL (ref 1.6–8.3)
NEUTROPHILS NFR BLD AUTO: 86 %
NITRATE UR QL: NEGATIVE
NRBC # BLD AUTO: 0 10E3/UL
NRBC BLD AUTO-RTO: 0 /100
P AXIS - MUSE: 49 DEGREES
PH UR STRIP: 5.5 [PH] (ref 5–7)
PLATELET # BLD AUTO: 224 10E3/UL (ref 150–450)
POTASSIUM SERPL-SCNC: 3.9 MMOL/L (ref 3.4–5.3)
PR INTERVAL - MUSE: 118 MS
PROT SERPL-MCNC: 7 G/DL (ref 6.4–8.3)
QRS DURATION - MUSE: 78 MS
QT - MUSE: 400 MS
QTC - MUSE: 425 MS
R AXIS - MUSE: 79 DEGREES
RBC # BLD AUTO: 4.68 10E6/UL (ref 3.8–5.2)
RBC URINE: 45 /HPF
SODIUM SERPL-SCNC: 140 MMOL/L (ref 135–145)
SP GR UR STRIP: 1.02 (ref 1–1.03)
SQUAMOUS EPITHELIAL: 6 /HPF
SYSTOLIC BLOOD PRESSURE - MUSE: 115 MMHG
T AXIS - MUSE: 46 DEGREES
TROPONIN T SERPL HS-MCNC: <6 NG/L
TSH SERPL DL<=0.005 MIU/L-ACNC: 0.53 UIU/ML (ref 0.3–4.2)
UROBILINOGEN UR STRIP-MCNC: NORMAL MG/DL
VENTRICULAR RATE- MUSE: 68 BPM
WBC # BLD AUTO: 10.9 10E3/UL (ref 4–11)
WBC URINE: 19 /HPF

## 2025-04-21 PROCEDURE — 81001 URINALYSIS AUTO W/SCOPE: CPT | Performed by: PHYSICIAN ASSISTANT

## 2025-04-21 PROCEDURE — 80053 COMPREHEN METABOLIC PANEL: CPT | Performed by: PHYSICIAN ASSISTANT

## 2025-04-21 PROCEDURE — 84443 ASSAY THYROID STIM HORMONE: CPT | Performed by: PHYSICIAN ASSISTANT

## 2025-04-21 PROCEDURE — 96360 HYDRATION IV INFUSION INIT: CPT

## 2025-04-21 PROCEDURE — 0503F POSTPARTUM CARE VISIT: CPT | Performed by: MIDWIFE

## 2025-04-21 PROCEDURE — 76856 US EXAM PELVIC COMPLETE: CPT

## 2025-04-21 PROCEDURE — 99207 PR POST PARTUM EXAM: CPT | Performed by: MIDWIFE

## 2025-04-21 PROCEDURE — 86140 C-REACTIVE PROTEIN: CPT | Performed by: PHYSICIAN ASSISTANT

## 2025-04-21 PROCEDURE — 99285 EMERGENCY DEPT VISIT HI MDM: CPT | Mod: 25

## 2025-04-21 PROCEDURE — 83735 ASSAY OF MAGNESIUM: CPT | Performed by: PHYSICIAN ASSISTANT

## 2025-04-21 PROCEDURE — 83690 ASSAY OF LIPASE: CPT | Performed by: PHYSICIAN ASSISTANT

## 2025-04-21 PROCEDURE — 258N000003 HC RX IP 258 OP 636: Performed by: PHYSICIAN ASSISTANT

## 2025-04-21 PROCEDURE — 99214 OFFICE O/P EST MOD 30 MIN: CPT | Performed by: MIDWIFE

## 2025-04-21 PROCEDURE — 3078F DIAST BP <80 MM HG: CPT | Performed by: MIDWIFE

## 2025-04-21 PROCEDURE — 96361 HYDRATE IV INFUSION ADD-ON: CPT

## 2025-04-21 PROCEDURE — 36415 COLL VENOUS BLD VENIPUNCTURE: CPT | Performed by: PHYSICIAN ASSISTANT

## 2025-04-21 PROCEDURE — 84484 ASSAY OF TROPONIN QUANT: CPT | Performed by: PHYSICIAN ASSISTANT

## 2025-04-21 PROCEDURE — 85025 COMPLETE CBC W/AUTO DIFF WBC: CPT | Performed by: PHYSICIAN ASSISTANT

## 2025-04-21 PROCEDURE — 93005 ELECTROCARDIOGRAM TRACING: CPT | Performed by: PHYSICIAN ASSISTANT

## 2025-04-21 PROCEDURE — 87086 URINE CULTURE/COLONY COUNT: CPT | Performed by: PHYSICIAN ASSISTANT

## 2025-04-21 PROCEDURE — 3074F SYST BP LT 130 MM HG: CPT | Performed by: MIDWIFE

## 2025-04-21 RX ORDER — ONDANSETRON 4 MG/1
4 TABLET, ORALLY DISINTEGRATING ORAL EVERY 8 HOURS PRN
Qty: 10 TABLET | Refills: 0 | Status: ON HOLD | OUTPATIENT
Start: 2025-04-21

## 2025-04-21 RX ADMIN — SODIUM CHLORIDE 1000 ML: 0.9 INJECTION, SOLUTION INTRAVENOUS at 12:13

## 2025-04-21 ASSESSMENT — ACTIVITIES OF DAILY LIVING (ADL)
ADLS_ACUITY_SCORE: 47

## 2025-04-21 ASSESSMENT — EDINBURGH POSTNATAL DEPRESSION SCALE (EPDS)
TOTAL SCORE: 2
I HAVE BEEN ANXIOUS OR WORRIED FOR NO GOOD REASON: NO, NOT AT ALL
I HAVE BEEN ABLE TO LAUGH AND SEE THE FUNNY SIDE OF THINGS: AS MUCH AS I ALWAYS COULD
THE THOUGHT OF HARMING MYSELF HAS OCCURRED TO ME: NEVER
I HAVE LOOKED FORWARD WITH ENJOYMENT TO THINGS: AS MUCH AS I EVER DID
I HAVE BEEN SO UNHAPPY THAT I HAVE BEEN CRYING: NO, NEVER
I HAVE FELT SAD OR MISERABLE: NO, NOT AT ALL
I HAVE BLAMED MYSELF UNNECESSARILY WHEN THINGS WENT WRONG: NOT VERY OFTEN
THINGS HAVE BEEN GETTING ON TOP OF ME: NO, MOST OF THE TIME I HAVE COPED QUITE WELL
I HAVE FELT SCARED OR PANICKY FOR NO GOOD REASON: NO, NOT AT ALL
I HAVE BEEN SO UNHAPPY THAT I HAVE HAD DIFFICULTY SLEEPING: NOT AT ALL

## 2025-04-21 ASSESSMENT — COLUMBIA-SUICIDE SEVERITY RATING SCALE - C-SSRS
1. IN THE PAST MONTH, HAVE YOU WISHED YOU WERE DEAD OR WISHED YOU COULD GO TO SLEEP AND NOT WAKE UP?: NO
2. HAVE YOU ACTUALLY HAD ANY THOUGHTS OF KILLING YOURSELF IN THE PAST MONTH?: NO
6. HAVE YOU EVER DONE ANYTHING, STARTED TO DO ANYTHING, OR PREPARED TO DO ANYTHING TO END YOUR LIFE?: NO

## 2025-04-21 NOTE — DISCHARGE INSTRUCTIONS
Your laboratory workup is reassuring.  As discussed here, your ultrasound shows some tissue in the uterus that is nonspecific.  When I called and spoke with OB regarding this, they recommended a follow-up in their clinic this week.  Please call them to set this up.    In the meantime, continue with Tylenol and ibuprofen to help with pain.  Continue pushing fluids to stay hydrated.  Make sure you are eating enough food and getting up sleep.    At any point if you develop any new or worsening symptoms do not hesitate to return to the ER immediately.

## 2025-04-21 NOTE — ED NOTES
Trini huang midwife from the [facility] upstairs calling regarding a patient that is 6 weeks postpartum.  .  Vaginal birth.  Uncomplicated.  Has been having severe abdominal pain since .  Reports significant vaginal bleeding.  Exam upstairs reveals a very unwell and pale appearing female who is having a hard time sitting up and ambulating due to pain.  Blood pressure noted to be 98/70 and a heart rate of 62.    Patient will be coming down to the ED now.  Charge nurse made aware.    CARLOTTA Jett Amanda, PA-C  [phone]

## 2025-04-21 NOTE — PROGRESS NOTES
Patient discharged home with AVS. Will follow up with OB this week in clinic. Ambulates independently at discharge.

## 2025-04-21 NOTE — ED NOTES
Expected Patient Referral to ED  11:08 AM    Referring Clinic/Provider:  [facility] ([facility])    Reason for referral/Clinical facts:    Midwife Trini from the [facility] upstairs calling regarding this patient who is 6 weeks postpartum.  Delivered vaginally on 3/9/2025.  .  Uncomplicated delivery.  Since  having severe abdominal pain and also having pretty significant vaginal bleeding.  Per midwife, patient is very unwell appearing.  She is pale and in severe pain.  Cannot ambulate and cannot sit up very well due to symptoms.  Blood pressure 98/70 and heart rate of 62.  Coming down now.  Charge nurse made aware.      Recommendations provided:  Send to ED for further evaluation    Caller was informed that this institution does possess the capabilities and/or resources to provide for patient and should be transferred to our facility.    Discussed that if direct admit is sought and any hurdles are encountered, this ED would be happy to see the patient and evaluate.    Informed caller that recommendations provided are recommendations based only on the facts provided and that they responsible to accept or reject the advice, or to seek a formal in person consultation as needed and that this ED will see/treat patient should they arrive.      Cecilia Campbell PA-C  [facility] EMERGENCY ROOM  [facility] [id number] [phone]       Cecilia Campbell PA-C  [phone]

## 2025-04-21 NOTE — PROGRESS NOTES
"6 week Postpartum Office Visit      Subjective:    Radha is a 29 year old female,  who presents for her routine 6 week appointment, however presents today for severe abdominal pain occurring since 25.  Radha had an  on 3/9/25 at 40 6/7 weeks, viable female infant named Maya who was 9lbs 2 oz.  She sustained a 1st degree perineal laceration extending to left labia.  She reports her recovery was initially routine.  She had bright red lochia until 25.  She reports the vaginal bleeding began again on 25 and was accompanied by severe abdominal pain that made her nauseated and vomited occasionally.  She was able to tolerate food and water.  She began feeling dizzy with migraine headaches for the past few days, with abdominal pain that was worsening. She reports vaginal bleeding that is heavy, going through \"1/2 pack of pads every day\".  No clots reported.  Denies fever.  Is not lactating s/p double mastectomy.  She was seen for a 2 week postpartum visit on 3/27/25 and was started on Aviane OCP's.    Today, she appears unwell.  She is pale and unable to ambulate without experiencing severe abdominal pain.  She cannot sit comfortably and tolerates laying supine only.  She reports feeling very \"weak and fatigued\".    Review of Systems:  Also a 12 point comprehensive review of systems was negative except as noted.       Patient Active Problem List   Diagnosis    Bilateral fibroadenomas of breasts    Endometriosis    Family history of breast cancer    History of bilateral mastectomy    History of ovarian cancer    Underweight    Seizure-like activity (H)    Childhood asthma without complication          Current Outpatient Medications:     acetaminophen (TYLENOL) 325 MG tablet, Take 2 tablets (650 mg) by mouth every 4 hours as needed for fever or other (second line or per patient preference for mild to moderate pain management)., Disp: , Rfl:     albuterol (PROAIR HFA/PROVENTIL HFA/VENTOLIN HFA) " 108 (90 Base) MCG/ACT inhaler, Inhale 2 puffs into the lungs., Disp: , Rfl:     cyanocobalamin (VITAMIN B-12) 1000 MCG tablet, Take 1 tablet (1,000 mcg) by mouth daily., Disp: 100 tablet, Rfl: 1    ferrous gluconate (FERGON) 324 (38 Fe) MG tablet, Take 1 tablet (324 mg) by mouth daily (with breakfast)., Disp: 100 tablet, Rfl: 3    ibuprofen (ADVIL/MOTRIN) 800 MG tablet, Take 1 tablet (800 mg) by mouth every 6 hours as needed for other (first line or per patient preference for mild to moderate pain management)., Disp: , Rfl:     levonorgestrel-ethinyl estradiol (AVIANE) 0.1-20 MG-MCG tablet, Take 1 tablet by mouth daily., Disp: 84 tablet, Rfl: 3    omeprazole (PRILOSEC) 20 MG DR capsule, Take 1 capsule (20 mg) by mouth daily., Disp: 30 capsule, Rfl: 1    psyllium (METAMUCIL/KONSYL) Packet, Take 1 packet by mouth daily., Disp: , Rfl:     vitamin C (ASCORBIC ACID) 250 MG tablet, Take 1 tablet (250 mg) by mouth daily., Disp: 100 tablet, Rfl: 1     Past Medical History:   Diagnosis Date    Anemia     Breast disorder 2021    Endometriosis 2008    Fibroadenoma of both breasts 2021    BIRADS 4 - Suspicious. US-Guided Biopsy of the Right Breast Mass at 11:00 6cmFN on 09/24/2020 revealed benign results of fibroadenoma without atypia or malignancy. Genetic Testing with [employer]ra 84-gene panel 10/13/2020 was Negative for pathogenic mutations.    Ovarian cancer, left (H) 2010    Oopherectomy   Stage 1        Past Surgical History:   Procedure Laterality Date    MASTECTOMY, RECONSTRUCT BREAST, COMBINED Bilateral     [id number]    oopherectomy Right 2010        Family History   Problem Relation Age of Onset    Unknown/Adopted Mother     Heart Disease Maternal Grandmother     Hypertension Maternal Grandmother     Diabetes Maternal Grandmother     Cerebrovascular Disease Maternal Grandmother     Breast Cancer Maternal Grandmother 26    Diabetes Maternal Grandfather     Hypertension Maternal Grandfather     Cerebrovascular Disease  "Maternal Grandfather     Heart Disease Maternal Grandfather     Breast Cancer Maternal Great-Grandmother 31       OB History    Para Term  AB Living   1 1 1 0 0 1   SAB IAB Ectopic Multiple Live Births   0 0 0 0 1      # Outcome Date GA Lbr Solitario/2nd Weight Sex Type Anes PTL Lv   1 Term 25 40w6d 02:14 / 03:04 4.139 kg (9 lb 2 oz) F Vag-Spont EPI N GARETH      Complications: Fetal Intolerance      Name: Maya Darling      Apgar1: 8  Apgar5: 9         Objective:   Vitals:    Vitals:    25 1033   BP: 98/70   BP Location: Left arm   Patient Position: Supine   Cuff Size: Adult Regular   Pulse: 62   Temp: 98.1  F (36.7  C)   TempSrc: Oral   SpO2: 97%   Weight: 51.5 kg (113 lb 8 oz)   Height: 1.626 m (5' 4\")       Physical Exam:    Attempted to perform abdominal exam with patient in supine position.  Light palpation revealed guarding and severe pain in all four quadrants of abdomen.  Unable to tolerate ongoing assessment and decision made to proceed to ED for ongoing assessment.    Assessment:  1)   6 weeks postpartum  2)  Acute abdominal pain  3) Heavy vaginal bleeding    Plan:  Report provided to ED RN and provider regarding patient needs and status.  Wheelchair utilized to transport Radha to [facility] ED.  Patient handed off to waiting ED staff for ongoing care.     BENNY Pino, CNM  40 minutes on the date of the encounter doing chart review, review of outside records, review of test results, interpretation of tests, patient visit, documentation, and discussion with other provider(s)    "

## 2025-04-21 NOTE — ED NOTES
Bed: [facility]ED-02  Expected date:   Expected time:   Means of arrival:   Comments:  Hold for Radha TAPIA

## 2025-04-21 NOTE — ED PROVIDER NOTES
EMERGENCY DEPARTMENT ENCOUNTER   NAME: Radha Graves ; AGE: 29 year old female ; YOB: 1995 ; MRN: [id number] ; PCP: No Ref-Primary, Physician     Evaluation Date & Time: 2025 11:15 AM    ED Provider: Cecilia Campbell PA-C    CHIEF COMPLAINT     Abdominal Pain and Vaginal Bleeding      FINAL ASSESSMENT       ICD-10-CM    1. Generalized abdominal pain  R10.84       2. Vaginal bleeding  N93.9           ED COURSE, MEDICAL DECISION MAKING, PLAN     ED course/notable events     11:43 AM Evaluated patient.   2:03 PM Staffed with Dr. Padilla.   2:06 PM Spoke with Dr. Villegas from OB.   2:11 PM Rechecked and updated patient. Discharge and follow up plans discussed.   ______________________________________________________________________    Reason for visit   Radha Graves is a 29 year old female with 6 weeks post-partum (), endometriosis, seizure-like activity presenting for vaginal bleeding since her vaginal delivery 6 weeks ago in addition to 2 to 3 weeks of severe abdominal pain.  Additionally having some lightheadedness, shortness of breath, chest pain, nausea.    Exam positives   Diffuse abdominal pain with palpation. Vaginal exam reveals some blood in the vaginal canal without any active bleeding. The rest of the exam is benign.     Vitals   WNL    Labs   Very reassuring labs.  UA has a large amount of blood in it with small amount of leukocyte esterase and small amount of white blood cells, but also squamous cells.  I do not think this looks infectious and she is not having any urinary symptoms.  Urine culture has been submitted to verify.    EKG   EKG was collected and independently interpreted by myself and also confirmed by ED MD.  Findings: Sinus rhythm with marked sinus arrhythmia.  Rate of 68.  QT/QTc 400/425.  No emergent ischemia or STEMI.  Comparisons: None    Imaging   Pelvic US (transvaginal refused): 1.  Ill-defined 1.6 x 1.6 x 1.0 cm area of heterogeneous hyperechoic tissue  posterior midline uterine body subendometrial junctional zone is incompletely characterized and nonspecific. However, review of recent OB imaging shows a posterior placenta therefore retained products of conception cannot be entirely excluded. Transvaginal ultrasound and/or MRI may be helpful to further characterize.    Interventions/recheck   Patient declined any analgesics or antiemetics here today for me.    Medications   sodium chloride 0.9% BOLUS 1,000 mL (0 mLs Intravenous Stopped 4/21/25 6002)     Procedures  /    Consults   OB - Dr. Villegas (recommends follow up in clinic this week)     Assessment   Abdominal pain   Vaginal bleeding     Patient coming to the ER from the OB clinic given report of severe abdominal pain, ongoing vaginal bleeding, appearing pale, and not being able to ambulate.  Patient reports that she was very presyncopal up in the clinic which I suspect led to the pallor and her not having the ability to ambulate.  Here on my exam she actually looks quite well.  No pallor noted.  She is communicating with me easily.  She does not appear to be in any acute distress.  Sitting on the gurney comfortably.    Exam did reveal diffuse abdominal tenderness and some blood in the vaginal canal, but no active bleeding.    Laboratory workup is largely reassuring.  Transabdominal ultrasound completed since patient did refuse the transvaginal and this did show a nonspecific heterogeneous hypoechoic tissue in the uterine body.    Low concern for acutely serious or life threatening intra-abdominal pathology such as acute appendicitis, cholecystitis, pancreatitis, SBO. No localized pain. No leukocytosis. No elevated CRP. No fever.   No concern for PE despite the shortness of breath and chest pain. She has not been tachycardic, has no pleuritic pain, no hypoxia, no clinical s/s of DVT.   ACS/carditis unlikely with reassuring EKG and troponin < 6.   No vaginal wound appreciated on exam to explain bleeding.   No  "concern for STI/PID nor UTI.     I did discuss patient's mental well-being today as she seems a little flat and sad. She states her mental health is good and she is just feeling tired and physically unwell. Reports really good support at home. No SI/HI.     I called and spoke with OB, they did not feel that any further workup was needed here in the ER given the reassuring laboratory workup and recommended that she be seen in clinic this week for follow-up.    Patient is comfortable with this plan.  She continues to deny the need for analgesics at this time.  She was agreeable with a prescription for Zofran to be used as needed.  She tells me she will continue with ibuprofen at home as needed.    Plan   -Disposition: Considered admission, but labs and/or imaging reassuring and/or patient feeling improved thus ultimately discharged.     -Prescription(s) provided:   Discharge Medication List as of 4/21/2025  2:26 PM        START taking these medications    Details   ondansetron (ZOFRAN ODT) 4 MG ODT tab Take 1 tablet (4 mg) by mouth every 8 hours as needed for nausea., Disp-10 tablet, R-0, E-Prescribe              -Referral(s)/specialist contact information given: /    -Recommendations: Recommended symptomatic cares including acetaminophen as needed, NSAIDs as needed, ice/heat, and fluids Recommended arranging a follow up with PCP.   Follow up with OB clinic this week.    Indications for re-evaluation in the ER discussed.   Patient/parent/guardian understanding and agreeable with the plan and will discharge to home in good condition.       Medical decision making factors  Independent historian(s):  Midwife from clinic  Care impacted by chronic illnesses: None  External records reviewed: relevant previous visit(s) as detailed below under HPI  Independent interpretation: Independently interpreted EKG as charted above under \"EKG.\"  and Independently interpreted labs as charted above under \"labs.\"   Consults: Discussed " "aspects of patient's care with another healthcare professional as discussed above under \"consults.\"   Disposition: See above under \"plan\"  Prescriptions: Yes. See above under \"prescription(s) provided.\"   MIPS: Not Applicable  Critical care: N/A  Sepsis: None         HISTORY OF PRESENT ILLNESS   Patient information was obtained from: Patient  and Other midwife from [facility]  Use of Intrepreter: None     Pertinent past medical history: 6 weeks post-partum (), endometriosis, seizure-like activity    Radha Graves is here by means of walk in  with self for evaluation of abdominal pain, ongoing vaginal bleeding, dizziness/lightheadedness, nausea, vomiting, shortness of breath, and chest pressure.    Patient states she had a vaginal delivery on 3/9/2025. States she did bleed a lot and had to get an iron transfusion after. States they had to put one stitch in after delivery \"because of the angle baby came out.\" Otherwise delivery was uncomplicated.     She reports her bleeding has been consistent since time of delivery. States she goes through 1-2 pads every hour. Does get some small clots.     The abdominal pain started 2-3 weeks after delivery. Reports it is sharp. States initially it was intermittent, but has now become constant. States when the pain is really bad she gets very nauseated and her chest feels very tight and feels like she cannot take in a full breath. States the pain radiates into her back.     States she went for her first post-partum check today and almost passed out in the clinic. She was then sent down here for more evaluation.     She states baby is healthy.     She denies any fevers.   No chills or body aches.   No purulent vaginal drainage or abnormal vaginal odor.   No urinary issues.     Per my chart review  3/9/2025: [facility] for delivery.  Patient had a spontaneous vaginal delivery.  She did have a first-degree perineal laceration extending superficially into " "the left labia.  It was repaired with 3 interrupted stitches.  She did have some low blood pressures after delivery and her hemoglobin was noted at 9.3.  She was given an IV iron infusion.  She did have some seizure-like activity during her hospital stay which was reported to not be new.  Neurology was consulted.  She had an EEG and brain MRI both of which were normal.  She was recommended to continue with follow-up as outpatient with neurology.  She was discharged on postpartum day 2.  3/27/2025: [facility] for 2-week postpartum checkup.  Had no complaints at this visit.  Was restarted on birth control.    PHYSICAL EXAM     First Vitals:  Patient Vitals for the past 24 hrs:   BP Temp Temp src Pulse Resp SpO2 Height Weight   04/21/25 1410 -- 98.1  F (36.7  C) Oral 87 27 100 % -- --   04/21/25 1400 102/63 -- -- 67 25 100 % -- --   04/21/25 1310 102/58 -- -- 65 17 100 % -- --   04/21/25 1300 -- -- -- 68 20 100 % -- --   04/21/25 1240 107/51 -- -- 65 20 99 % -- --   04/21/25 1215 -- -- -- 75 18 100 % -- --   04/21/25 1200 98/64 -- -- 74 20 100 % -- --   04/21/25 1114 103/76 98.1  F (36.7  C) Oral 81 18 99 % 1.626 m (5' 4\") 51.3 kg (113 lb)       PHYSICAL EXAM:   Constitutional: No acute distress.  Neuro: Awake and alert. No focal deficits.  Psych: Flat affect. Somewhat melancholic appearing. Calm and cooperative.  Eyes: PERRL. EOMI. Conjunctivae clear. No crusting or mattering of the lids or lashes.    Mouth: Pink and moist. No oropharynx erythema. No oropharynx edema. No exudates. No trismus. No muffled voice. No uvula deviation. Handling own secretions.    Lymph: No cervical or tonsillar lymphadenopathy.  Cardio: Regular rate. Adequate perfusion to extremities. Regular rhythm. No murmurs.  Pulmonary: Oxygenating well on RA. No labored breathing. No wheezes. No rales. No rhonchi.   Abdomen: Diffuse abdominal pain on palpation.  BS present. Soft. Non-distended. No rigidity. No rebound. No guarding.   : Vaginal " speculum exam uncomfortable for patient due to pain.  There is some blood noted in the vaginal canal, but no active bleeding.  I cannot appreciate any open wounds or sores.  No purulent discharge.    Back: Appears to move freely. No CVA tenderness.     Skin: Natural color, warm, dry, intact.         MEDICAL HISTORY     Past Medical History:   Diagnosis Date    Anemia     Breast disorder 2021    Endometriosis 2008    Fibroadenoma of both breasts 2021    Ovarian cancer, left (H) 2010       Past Surgical History:   Procedure Laterality Date    MASTECTOMY, RECONSTRUCT BREAST, COMBINED Bilateral     [id number]    oopherectomy Right 2010       Family History   Problem Relation Age of Onset    Unknown/Adopted Mother     Heart Disease Maternal Grandmother     Hypertension Maternal Grandmother     Diabetes Maternal Grandmother     Cerebrovascular Disease Maternal Grandmother     Breast Cancer Maternal Grandmother 26    Diabetes Maternal Grandfather     Hypertension Maternal Grandfather     Cerebrovascular Disease Maternal Grandfather     Heart Disease Maternal Grandfather     Breast Cancer Maternal Great-Grandmother 31       Social History     Tobacco Use    Smoking status: Never     Passive exposure: Never    Smokeless tobacco: Never   Vaping Use    Vaping status: Never Used   Substance Use Topics    Alcohol use: Not Currently    Drug use: Never       Medications   ondansetron (ZOFRAN ODT) 4 MG ODT tab  acetaminophen (TYLENOL) 325 MG tablet  albuterol (PROAIR HFA/PROVENTIL HFA/VENTOLIN HFA) 108 (90 Base) MCG/ACT inhaler  cyanocobalamin (VITAMIN B-12) 1000 MCG tablet  ferrous gluconate (FERGON) 324 (38 Fe) MG tablet  ibuprofen (ADVIL/MOTRIN) 800 MG tablet  levonorgestrel-ethinyl estradiol (AVIANE) 0.1-20 MG-MCG tablet  omeprazole (PRILOSEC) 20 MG DR capsule  psyllium (METAMUCIL/KONSYL) Packet  vitamin C (ASCORBIC ACID) 250 MG tablet        RESULTS     LAB:  All pertinent labs reviewed and interpreted  Labs Ordered and  Resulted from Time of ED Arrival to Time of ED Departure   UA MACROSCOPIC WITH REFLEX TO MICRO AND CULTURE - Abnormal       Result Value    Color Urine Dark Yellow (*)     Appearance Urine Turbid (*)     Glucose Urine Negative      Bilirubin Urine Negative      Ketones Urine 10 (*)     Specific Gravity Urine 1.023      Blood Urine >1.0 mg/dL (*)     pH Urine 5.5      Protein Albumin Urine 50 (*)     Urobilinogen Urine Normal      Nitrite Urine Negative      Leukocyte Esterase Urine 25 [place]/uL (*)     Mucus Urine Present (*)     RBC Urine 45 (*)     WBC Urine 19 (*)     Squamous Epithelials Urine 6 (*)    CBC WITH PLATELETS AND DIFFERENTIAL - Abnormal    WBC Count 10.9      RBC Count 4.68      Hemoglobin 12.4      Hematocrit 38.2      MCV 82      MCH 26.5      MCHC 32.5      RDW 16.1 (*)     Platelet Count 224      % Neutrophils 86      % Lymphocytes 11      % Monocytes 3      % Eosinophils 0      % Basophils 0      % Immature Granulocytes 0      NRBCs per 100 WBC 0      Absolute Neutrophils 9.3 (*)     Absolute Lymphocytes 1.2      Absolute Monocytes 0.3      Absolute Eosinophils 0.0      Absolute Basophils 0.0      Absolute Immature Granulocytes 0.0      Absolute NRBCs 0.0     COMPREHENSIVE METABOLIC PANEL - Normal    Sodium 140      Potassium 3.9      Carbon Dioxide (CO2) 22      Anion Gap 12      Urea Nitrogen 13.2      Creatinine 0.64      GFR Estimate >90      Calcium 9.0      Chloride 106      Glucose 83      Alkaline Phosphatase 67      AST 14      ALT 6      Protein Total 7.0      Albumin 4.4      Bilirubin Total 0.4     LIPASE - Normal    Lipase 21     MAGNESIUM - Normal    Magnesium 2.1     TSH WITH FREE T4 REFLEX - Normal    TSH 0.53     TROPONIN T, HIGH SENSITIVITY - Normal    Troponin T, High Sensitivity <6     CRP INFLAMMATION - Normal    CRP Inflammation <3.00     URINE CULTURE       RADIOLOGY:  US Pelvis Complete without Transvaginal   Final Result   IMPRESSION:   1.  Ill-defined 1.6 x 1.6 x 1.0 cm  area of heterogeneous hyperechoic tissue posterior midline uterine body subendometrial junctional zone is incompletely characterized and nonspecific. However, review of recent OB imaging shows a posterior placenta    therefore retained products of conception cannot be entirely excluded. Transvaginal ultrasound and/or MRI may be helpful to further characterize.                        Some or all of this documentation has been completed using dictation software and grammatical errors may be present. Please contact me with any concerns regarding this.     Cecilia Campbell PA-C  Emergency Medicine   [facility] EMERGENCY ROOM       Cecilia Campbell PA-C  04/21/25 6245

## 2025-04-21 NOTE — ED TRIAGE NOTES
Pt presents to the ED after being sent in by clinic. Pt had her post partum check up today. Endorses abdominal pain and continued vaginal bleeding. States she needs to change her pad about every two hours. + lightheadedness.      Triage Assessment (Adult)       Row Name 04/21/25 1116          Triage Assessment    Airway WDL WDL        Respiratory WDL    Respiratory WDL WDL        Skin Circulation/Temperature WDL    Skin Circulation/Temperature WDL WDL        Cardiac WDL    Cardiac WDL WDL        Peripheral/Neurovascular WDL    Peripheral Neurovascular WDL WDL        Cognitive/Neuro/Behavioral WDL    Cognitive/Neuro/Behavioral WDL WDL

## 2025-04-21 NOTE — ED PROVIDER NOTES
"Emergency Department Midlevel Supervisory Note     I had a face to face encounter with this patient seen by the Advanced Practice Provider (SHERYL). I personally made/approved the management plan and take responsibility for the patient management. I personally saw patient and performed a substantive portion of the visit including all aspects of the medical decision making.     ED Course:  2:00 PM Cecilia Campbell PA-C staffed patient with me. I agree with their assessment and plan of management, and I will see the patient.  I met with the patient to introduce myself, gather additional history, perform my initial exam, and discuss the plan.     MDM:  ED Course as of 04/21/25 1425   Mon Apr 21, 2025   1400 SHERYL supervisory note: 30 yo F at 6 week post partum. Persistent vaginal bleeding. Vaginal pain for three weeks. US shows thickened endometrium, ?retained products. Although abd tenderness, labs very reassuring, no fever.    1421 OB consulted, ok for outpatient workup at this point. I met the pt. She will arrange outpatient OB follow up.         1. Generalized abdominal pain    2. Vaginal bleeding        Brief HPI:     Radha Graves is a 29 year old female who presents for evaluation of abdominal pain and vaginal bleeding. Since her vaginal delivery on 3/9/2025, patient has been experiencing vaginal bleeding every day, approximately 1-2 pads every hour. She also reports sharp abdominal pain that started 2-3 weeks after her delivery. Patient endorses nausea, chest tightness, and shortness of breath.      Brief Physical Exam: /58 (Patient Position: Semi-Arias's, Cuff Size: Adult Regular)   Pulse 65   Temp 98.1  F (36.7  C) (Oral)   Resp 17   Ht 1.626 m (5' 4\")   Wt 51.3 kg (113 lb)   LMP 06/06/2024 (Within Days)   SpO2 100%   BMI 19.40 kg/m    Constitutional:  Alert, in no acute distress  EYES: Conjunctivae clear  HENT:  Atraumatic  Respiratory:  Respirations even, unlabored, in no acute respiratory " distress  Cardiovascular:  Regular rate and rhythm, good peripheral perfusion  Musculoskeletal:  Moves all 4 extremities equally, grossly symmetrical strength  Integument: Warm & dry. No appreciable rash, erythema.  Neurologic:  Alert & oriented, speech clear and fluent, no focal deficits noted  Psych: Normal mood and affect      Labs and Imaging:  Results for orders placed or performed during the hospital encounter of 04/21/25   US Pelvis Complete without Transvaginal    Impression    IMPRESSION:  1.  Ill-defined 1.6 x 1.6 x 1.0 cm area of heterogeneous hyperechoic tissue posterior midline uterine body subendometrial junctional zone is incompletely characterized and nonspecific. However, review of recent OB imaging shows a posterior placenta   therefore retained products of conception cannot be entirely excluded. Transvaginal ultrasound and/or MRI may be helpful to further characterize.         UA Macroscopic with reflex to Microscopic and Culture    Specimen: Urine, NOS   Result Value Ref Range    Color Urine Dark Yellow (A) Colorless, Straw, Light Yellow, Yellow    Appearance Urine Turbid (A) Clear    Glucose Urine Negative Negative mg/dL    Bilirubin Urine Negative Negative    Ketones Urine 10 (A) Negative mg/dL    Specific Gravity Urine 1.023 1.001 - 1.030    Blood Urine >1.0 mg/dL (A) Negative    pH Urine 5.5 5.0 - 7.0    Protein Albumin Urine 50 (A) Negative mg/dL    Urobilinogen Urine Normal Normal mg/dL    Nitrite Urine Negative Negative    Leukocyte Esterase Urine 25 [place]/uL (A) Negative    Mucus Urine Present (A) None Seen /LPF    RBC Urine 45 (H) <=2 /HPF    WBC Urine 19 (H) <=5 /HPF    Squamous Epithelials Urine 6 (H) <=1 /HPF   Comprehensive metabolic panel   Result Value Ref Range    Sodium 140 135 - 145 mmol/L    Potassium 3.9 3.4 - 5.3 mmol/L    Carbon Dioxide (CO2) 22 22 - 29 mmol/L    Anion Gap 12 7 - 15 mmol/L    Urea Nitrogen 13.2 6.0 - 20.0 mg/dL    Creatinine 0.64 0.51 - 0.95 mg/dL    GFR  Estimate >90 >60 mL/min/1.73m2    Calcium 9.0 8.8 - 10.4 mg/dL    Chloride 106 98 - 107 mmol/L    Glucose 83 70 - 99 mg/dL    Alkaline Phosphatase 67 40 - 150 U/L    AST 14 0 - 45 U/L    ALT 6 0 - 50 U/L    Protein Total 7.0 6.4 - 8.3 g/dL    Albumin 4.4 3.5 - 5.2 g/dL    Bilirubin Total 0.4 <=1.2 mg/dL   Result Value Ref Range    Lipase 21 13 - 60 U/L   Result Value Ref Range    Magnesium 2.1 1.7 - 2.3 mg/dL   TSH with free T4 reflex   Result Value Ref Range    TSH 0.53 0.30 - 4.20 uIU/mL   Result Value Ref Range    Troponin T, High Sensitivity <6 <=14 ng/L   Result Value Ref Range    CRP Inflammation <3.00 <5.00 mg/L   CBC with platelets and differential   Result Value Ref Range    WBC Count 10.9 4.0 - 11.0 10e3/uL    RBC Count 4.68 3.80 - 5.20 10e6/uL    Hemoglobin 12.4 11.7 - 15.7 g/dL    Hematocrit 38.2 35.0 - 47.0 %    MCV 82 78 - 100 fL    MCH 26.5 26.5 - 33.0 pg    MCHC 32.5 31.5 - 36.5 g/dL    RDW 16.1 (H) 10.0 - 15.0 %    Platelet Count 224 150 - 450 10e3/uL    % Neutrophils 86 %    % Lymphocytes 11 %    % Monocytes 3 %    % Eosinophils 0 %    % Basophils 0 %    % Immature Granulocytes 0 %    NRBCs per 100 WBC 0 <1 /100    Absolute Neutrophils 9.3 (H) 1.6 - 8.3 10e3/uL    Absolute Lymphocytes 1.2 0.8 - 5.3 10e3/uL    Absolute Monocytes 0.3 0.0 - 1.3 10e3/uL    Absolute Eosinophils 0.0 0.0 - 0.7 10e3/uL    Absolute Basophils 0.0 0.0 - 0.2 10e3/uL    Absolute Immature Granulocytes 0.0 <=0.4 10e3/uL    Absolute NRBCs 0.0 10e3/uL       I have reviewed the relevant laboratory studies above.    Any images independently reviewed are listed in the medical decision making    EKG: I reviewed and independently interpreted the patient's EKG, with comments/interpretation noted in the MDM    Procedures:  I was present for the key portions of procedures documented in SHERYL/midlevel note, see midlevel note for further details.    Kobe Padilla MD  [facility] EMERGENCY ROOM  [address]  [place] [address]  [phone]     Kobe Padilla MD  04/21/25 8553

## 2025-04-22 LAB — BACTERIA UR CULT: NO GROWTH

## 2025-04-24 ENCOUNTER — ANESTHESIA EVENT (OUTPATIENT)
Dept: SURGERY | Facility: CLINIC | Age: 30
End: 2025-04-24
Payer: COMMERCIAL

## 2025-04-24 ENCOUNTER — HOSPITAL ENCOUNTER (OUTPATIENT)
Dept: ULTRASOUND IMAGING | Facility: CLINIC | Age: 30
Discharge: HOME | End: 2025-04-24
Attending: ADVANCED PRACTICE MIDWIFE
Payer: COMMERCIAL

## 2025-04-24 ENCOUNTER — ANESTHESIA (OUTPATIENT)
Dept: SURGERY | Facility: CLINIC | Age: 30
End: 2025-04-24
Payer: COMMERCIAL

## 2025-04-24 ENCOUNTER — APPOINTMENT (OUTPATIENT)
Dept: ULTRASOUND IMAGING | Facility: CLINIC | Age: 30
End: 2025-04-24
Attending: OBSTETRICS & GYNECOLOGY
Payer: COMMERCIAL

## 2025-04-24 ENCOUNTER — HOSPITAL ENCOUNTER (OUTPATIENT)
Facility: CLINIC | Age: 30
Discharge: HOME | End: 2025-04-25
Attending: OBSTETRICS & GYNECOLOGY | Admitting: OBSTETRICS & GYNECOLOGY
Payer: COMMERCIAL

## 2025-04-24 ENCOUNTER — OFFICE VISIT (OUTPATIENT)
Dept: MIDWIFE SERVICES | Facility: CLINIC | Age: 30
End: 2025-04-24
Payer: COMMERCIAL

## 2025-04-24 ENCOUNTER — TRANSFERRED RECORDS (OUTPATIENT)
Dept: HEALTH INFORMATION MANAGEMENT | Facility: CLINIC | Age: 30
End: 2025-04-24

## 2025-04-24 VITALS
HEIGHT: 64 IN | BODY MASS INDEX: 20.04 KG/M2 | WEIGHT: 117.4 LBS | HEART RATE: 82 BPM | SYSTOLIC BLOOD PRESSURE: 98 MMHG | OXYGEN SATURATION: 97 % | DIASTOLIC BLOOD PRESSURE: 60 MMHG

## 2025-04-24 LAB
HGB BLD-MCNC: 11.2 G/DL (ref 11.7–15.7)
RADIOLOGIST FLAGS: ABNORMAL

## 2025-04-24 PROCEDURE — 85018 HEMOGLOBIN: CPT | Performed by: OBSTETRICS & GYNECOLOGY

## 2025-04-24 PROCEDURE — 710N000012 HC RECOVERY PHASE 2, PER MINUTE: Performed by: OBSTETRICS & GYNECOLOGY

## 2025-04-24 PROCEDURE — 88305 TISSUE EXAM BY PATHOLOGIST: CPT | Mod: TC | Performed by: OBSTETRICS & GYNECOLOGY

## 2025-04-24 PROCEDURE — 250N000011 HC RX IP 250 OP 636: Performed by: OBSTETRICS & GYNECOLOGY

## 2025-04-24 PROCEDURE — 250N000013 HC RX MED GY IP 250 OP 250 PS 637: Performed by: OBSTETRICS & GYNECOLOGY

## 2025-04-24 PROCEDURE — 258N000003 HC RX IP 258 OP 636: Performed by: ANESTHESIOLOGY

## 2025-04-24 PROCEDURE — 88305 TISSUE EXAM BY PATHOLOGIST: CPT | Mod: 26 | Performed by: STUDENT IN AN ORGANIZED HEALTH CARE EDUCATION/TRAINING PROGRAM

## 2025-04-24 PROCEDURE — 76830 TRANSVAGINAL US NON-OB: CPT

## 2025-04-24 PROCEDURE — 250N000013 HC RX MED GY IP 250 OP 250 PS 637: Performed by: ANESTHESIOLOGY

## 2025-04-24 PROCEDURE — 250N000011 HC RX IP 250 OP 636: Mod: JZ | Performed by: ANESTHESIOLOGY

## 2025-04-24 PROCEDURE — 250N000009 HC RX 250: Performed by: OBSTETRICS & GYNECOLOGY

## 2025-04-24 PROCEDURE — 370N000017 HC ANESTHESIA TECHNICAL FEE, PER MIN: Performed by: OBSTETRICS & GYNECOLOGY

## 2025-04-24 PROCEDURE — 258N000003 HC RX IP 258 OP 636: Performed by: OBSTETRICS & GYNECOLOGY

## 2025-04-24 PROCEDURE — 999N000063 US INTRAOPERATIVE

## 2025-04-24 PROCEDURE — 250N000011 HC RX IP 250 OP 636: Performed by: NURSE ANESTHETIST, CERTIFIED REGISTERED

## 2025-04-24 PROCEDURE — 36415 COLL VENOUS BLD VENIPUNCTURE: CPT | Performed by: OBSTETRICS & GYNECOLOGY

## 2025-04-24 PROCEDURE — 360N000076 HC SURGERY LEVEL 3, PER MIN: Performed by: OBSTETRICS & GYNECOLOGY

## 2025-04-24 PROCEDURE — 272N000001 HC OR GENERAL SUPPLY STERILE: Performed by: OBSTETRICS & GYNECOLOGY

## 2025-04-24 PROCEDURE — 999N000141 HC STATISTIC PRE-PROCEDURE NURSING ASSESSMENT: Performed by: OBSTETRICS & GYNECOLOGY

## 2025-04-24 RX ORDER — PROCHLORPERAZINE MALEATE 10 MG
10 TABLET ORAL EVERY 6 HOURS PRN
Status: DISCONTINUED | OUTPATIENT
Start: 2025-04-24 | End: 2025-04-25 | Stop reason: HOSPADM

## 2025-04-24 RX ORDER — AMOXICILLIN 250 MG
1 CAPSULE ORAL 2 TIMES DAILY
Status: DISCONTINUED | OUTPATIENT
Start: 2025-04-24 | End: 2025-04-25 | Stop reason: HOSPADM

## 2025-04-24 RX ORDER — ACETAMINOPHEN 325 MG/1
650 TABLET ORAL EVERY 4 HOURS PRN
Status: DISCONTINUED | OUTPATIENT
Start: 2025-04-24 | End: 2025-04-25 | Stop reason: HOSPADM

## 2025-04-24 RX ORDER — LIDOCAINE 40 MG/G
CREAM TOPICAL
Status: DISCONTINUED | OUTPATIENT
Start: 2025-04-24 | End: 2025-04-25 | Stop reason: HOSPADM

## 2025-04-24 RX ORDER — DEXAMETHASONE SODIUM PHOSPHATE 4 MG/ML
INJECTION, SOLUTION INTRA-ARTICULAR; INTRALESIONAL; INTRAMUSCULAR; INTRAVENOUS; SOFT TISSUE PRN
Status: DISCONTINUED | OUTPATIENT
Start: 2025-04-24 | End: 2025-04-24

## 2025-04-24 RX ORDER — CEFAZOLIN SODIUM/WATER 2 G/20 ML
2 SYRINGE (ML) INTRAVENOUS ONCE
Status: COMPLETED | OUTPATIENT
Start: 2025-04-24 | End: 2025-04-24

## 2025-04-24 RX ORDER — ACETAMINOPHEN 325 MG/1
650 TABLET ORAL EVERY 6 HOURS
Status: DISCONTINUED | OUTPATIENT
Start: 2025-04-28 | End: 2025-04-25 | Stop reason: HOSPADM

## 2025-04-24 RX ORDER — BISACODYL 10 MG
10 SUPPOSITORY, RECTAL RECTAL DAILY PRN
Status: DISCONTINUED | OUTPATIENT
Start: 2025-04-24 | End: 2025-04-25 | Stop reason: HOSPADM

## 2025-04-24 RX ORDER — NALOXONE HYDROCHLORIDE 0.4 MG/ML
0.2 INJECTION, SOLUTION INTRAMUSCULAR; INTRAVENOUS; SUBCUTANEOUS
Status: DISCONTINUED | OUTPATIENT
Start: 2025-04-24 | End: 2025-04-25 | Stop reason: HOSPADM

## 2025-04-24 RX ORDER — ACETAMINOPHEN 325 MG/1
975 TABLET ORAL ONCE
Status: DISCONTINUED | OUTPATIENT
Start: 2025-04-25 | End: 2025-04-24

## 2025-04-24 RX ORDER — FENTANYL CITRATE 50 UG/ML
100 INJECTION, SOLUTION INTRAMUSCULAR; INTRAVENOUS ONCE
Status: COMPLETED | OUTPATIENT
Start: 2025-04-24 | End: 2025-04-24

## 2025-04-24 RX ORDER — LIDOCAINE HYDROCHLORIDE 10 MG/ML
INJECTION, SOLUTION EPIDURAL; INFILTRATION; INTRACAUDAL; PERINEURAL
Status: DISCONTINUED
Start: 2025-04-24 | End: 2025-04-24 | Stop reason: HOSPADM

## 2025-04-24 RX ORDER — POLYETHYLENE GLYCOL 3350 17 G/17G
17 POWDER, FOR SOLUTION ORAL DAILY PRN
Status: DISCONTINUED | OUTPATIENT
Start: 2025-04-25 | End: 2025-04-25 | Stop reason: HOSPADM

## 2025-04-24 RX ORDER — PROPOFOL 10 MG/ML
INJECTION, EMULSION INTRAVENOUS CONTINUOUS PRN
Status: DISCONTINUED | OUTPATIENT
Start: 2025-04-24 | End: 2025-04-24

## 2025-04-24 RX ORDER — ACETAMINOPHEN 325 MG/1
975 TABLET ORAL ONCE
Status: COMPLETED | OUTPATIENT
Start: 2025-04-24 | End: 2025-04-24

## 2025-04-24 RX ORDER — ALBUTEROL SULFATE 90 UG/1
2 INHALANT RESPIRATORY (INHALATION) EVERY 4 HOURS PRN
Status: DISCONTINUED | OUTPATIENT
Start: 2025-04-24 | End: 2025-04-25 | Stop reason: HOSPADM

## 2025-04-24 RX ORDER — SODIUM CHLORIDE, SODIUM LACTATE, POTASSIUM CHLORIDE, CALCIUM CHLORIDE 600; 310; 30; 20 MG/100ML; MG/100ML; MG/100ML; MG/100ML
INJECTION, SOLUTION INTRAVENOUS CONTINUOUS
Status: DISCONTINUED | OUTPATIENT
Start: 2025-04-24 | End: 2025-04-25 | Stop reason: HOSPADM

## 2025-04-24 RX ORDER — ACETAMINOPHEN 325 MG/1
975 TABLET ORAL ONCE
Status: DISCONTINUED | OUTPATIENT
Start: 2025-04-24 | End: 2025-04-24 | Stop reason: HOSPADM

## 2025-04-24 RX ORDER — HYDROMORPHONE HYDROCHLORIDE 2 MG/1
4 TABLET ORAL
Status: DISCONTINUED | OUTPATIENT
Start: 2025-04-24 | End: 2025-04-24

## 2025-04-24 RX ORDER — ONDANSETRON 4 MG/1
4 TABLET, ORALLY DISINTEGRATING ORAL EVERY 30 MIN PRN
Status: DISCONTINUED | OUTPATIENT
Start: 2025-04-24 | End: 2025-04-24 | Stop reason: HOSPADM

## 2025-04-24 RX ORDER — KETOROLAC TROMETHAMINE 15 MG/ML
15 INJECTION, SOLUTION INTRAMUSCULAR; INTRAVENOUS EVERY 6 HOURS
Status: DISCONTINUED | OUTPATIENT
Start: 2025-04-25 | End: 2025-04-25 | Stop reason: HOSPADM

## 2025-04-24 RX ORDER — KETOROLAC TROMETHAMINE 30 MG/ML
INJECTION, SOLUTION INTRAMUSCULAR; INTRAVENOUS PRN
Status: DISCONTINUED | OUTPATIENT
Start: 2025-04-24 | End: 2025-04-24

## 2025-04-24 RX ORDER — PROPOFOL 10 MG/ML
INJECTION, EMULSION INTRAVENOUS PRN
Status: DISCONTINUED | OUTPATIENT
Start: 2025-04-24 | End: 2025-04-24

## 2025-04-24 RX ORDER — FENTANYL CITRATE 50 UG/ML
25 INJECTION, SOLUTION INTRAMUSCULAR; INTRAVENOUS
Status: DISCONTINUED | OUTPATIENT
Start: 2025-04-24 | End: 2025-04-24 | Stop reason: HOSPADM

## 2025-04-24 RX ORDER — HYDROMORPHONE HYDROCHLORIDE 2 MG/1
4 TABLET ORAL EVERY 4 HOURS PRN
Status: DISCONTINUED | OUTPATIENT
Start: 2025-04-24 | End: 2025-04-25 | Stop reason: HOSPADM

## 2025-04-24 RX ORDER — NALOXONE HYDROCHLORIDE 0.4 MG/ML
0.4 INJECTION, SOLUTION INTRAMUSCULAR; INTRAVENOUS; SUBCUTANEOUS
Status: DISCONTINUED | OUTPATIENT
Start: 2025-04-24 | End: 2025-04-25 | Stop reason: HOSPADM

## 2025-04-24 RX ORDER — NALOXONE HYDROCHLORIDE 0.4 MG/ML
0.1 INJECTION, SOLUTION INTRAMUSCULAR; INTRAVENOUS; SUBCUTANEOUS
Status: DISCONTINUED | OUTPATIENT
Start: 2025-04-24 | End: 2025-04-24 | Stop reason: HOSPADM

## 2025-04-24 RX ORDER — OXYTOCIN/0.9 % SODIUM CHLORIDE 30/500 ML
250 PLASTIC BAG, INJECTION (ML) INTRAVENOUS CONTINUOUS
Status: DISCONTINUED | OUTPATIENT
Start: 2025-04-24 | End: 2025-04-25 | Stop reason: HOSPADM

## 2025-04-24 RX ORDER — HYDROMORPHONE HCL IN WATER/PF 6 MG/30 ML
0.4 PATIENT CONTROLLED ANALGESIA SYRINGE INTRAVENOUS
Status: DISCONTINUED | OUTPATIENT
Start: 2025-04-24 | End: 2025-04-25 | Stop reason: HOSPADM

## 2025-04-24 RX ORDER — LIDOCAINE HYDROCHLORIDE 10 MG/ML
INJECTION, SOLUTION EPIDURAL; INFILTRATION; INTRACAUDAL; PERINEURAL PRN
Status: DISCONTINUED | OUTPATIENT
Start: 2025-04-24 | End: 2025-04-24 | Stop reason: HOSPADM

## 2025-04-24 RX ORDER — ACETAMINOPHEN 325 MG/1
975 TABLET ORAL EVERY 6 HOURS
Status: DISCONTINUED | OUTPATIENT
Start: 2025-04-25 | End: 2025-04-25 | Stop reason: HOSPADM

## 2025-04-24 RX ORDER — ONDANSETRON 2 MG/ML
4 INJECTION INTRAMUSCULAR; INTRAVENOUS EVERY 30 MIN PRN
Status: DISCONTINUED | OUTPATIENT
Start: 2025-04-24 | End: 2025-04-24 | Stop reason: HOSPADM

## 2025-04-24 RX ORDER — ONDANSETRON 2 MG/ML
4 INJECTION INTRAMUSCULAR; INTRAVENOUS EVERY 6 HOURS PRN
Status: DISCONTINUED | OUTPATIENT
Start: 2025-04-24 | End: 2025-04-25 | Stop reason: HOSPADM

## 2025-04-24 RX ORDER — FENTANYL CITRATE 50 UG/ML
INJECTION, SOLUTION INTRAMUSCULAR; INTRAVENOUS PRN
Status: DISCONTINUED | OUTPATIENT
Start: 2025-04-24 | End: 2025-04-24

## 2025-04-24 RX ORDER — HYDROXYZINE HYDROCHLORIDE 25 MG/1
25 TABLET, FILM COATED ORAL EVERY 6 HOURS PRN
Status: DISCONTINUED | OUTPATIENT
Start: 2025-04-24 | End: 2025-04-25 | Stop reason: HOSPADM

## 2025-04-24 RX ORDER — IBUPROFEN 800 MG/1
800 TABLET, FILM COATED ORAL EVERY 6 HOURS
Status: DISCONTINUED | OUTPATIENT
Start: 2025-04-25 | End: 2025-04-25 | Stop reason: HOSPADM

## 2025-04-24 RX ORDER — SODIUM PHOSPHATE,MONO-DIBASIC 19G-7G/118
1 ENEMA (ML) RECTAL
Status: DISCONTINUED | OUTPATIENT
Start: 2025-04-24 | End: 2025-04-25 | Stop reason: HOSPADM

## 2025-04-24 RX ORDER — ACETAMINOPHEN 325 MG/1
325-650 TABLET ORAL EVERY 4 HOURS PRN
Qty: 12 TABLET | Refills: 0 | Status: SHIPPED | OUTPATIENT
Start: 2025-04-24

## 2025-04-24 RX ORDER — DEXAMETHASONE SODIUM PHOSPHATE 10 MG/ML
4 INJECTION, SOLUTION INTRAMUSCULAR; INTRAVENOUS
Status: DISCONTINUED | OUTPATIENT
Start: 2025-04-24 | End: 2025-04-24 | Stop reason: HOSPADM

## 2025-04-24 RX ORDER — HYDROMORPHONE HYDROCHLORIDE 2 MG/1
2 TABLET ORAL EVERY 4 HOURS PRN
Status: DISCONTINUED | OUTPATIENT
Start: 2025-04-24 | End: 2025-04-25 | Stop reason: HOSPADM

## 2025-04-24 RX ORDER — OXYCODONE HYDROCHLORIDE 5 MG/1
5 TABLET ORAL
Status: DISCONTINUED | OUTPATIENT
Start: 2025-04-24 | End: 2025-04-24 | Stop reason: HOSPADM

## 2025-04-24 RX ORDER — OXYCODONE HYDROCHLORIDE 5 MG/1
10 TABLET ORAL
Status: DISCONTINUED | OUTPATIENT
Start: 2025-04-24 | End: 2025-04-24 | Stop reason: HOSPADM

## 2025-04-24 RX ORDER — HYDROMORPHONE HCL IN WATER/PF 6 MG/30 ML
0.2 PATIENT CONTROLLED ANALGESIA SYRINGE INTRAVENOUS
Status: DISCONTINUED | OUTPATIENT
Start: 2025-04-24 | End: 2025-04-25 | Stop reason: HOSPADM

## 2025-04-24 RX ORDER — ONDANSETRON 2 MG/ML
INJECTION INTRAMUSCULAR; INTRAVENOUS PRN
Status: DISCONTINUED | OUTPATIENT
Start: 2025-04-24 | End: 2025-04-24

## 2025-04-24 RX ORDER — METHYLERGONOVINE MALEATE 0.2 MG/ML
INJECTION INTRAVENOUS
Status: DISCONTINUED
Start: 2025-04-24 | End: 2025-04-24 | Stop reason: HOSPADM

## 2025-04-24 RX ORDER — IBUPROFEN 800 MG/1
800 TABLET, FILM COATED ORAL ONCE
Status: DISCONTINUED | OUTPATIENT
Start: 2025-04-25 | End: 2025-04-24

## 2025-04-24 RX ORDER — ONDANSETRON 4 MG/1
4 TABLET, ORALLY DISINTEGRATING ORAL EVERY 6 HOURS PRN
Status: DISCONTINUED | OUTPATIENT
Start: 2025-04-24 | End: 2025-04-25 | Stop reason: HOSPADM

## 2025-04-24 RX ADMIN — HYDROMORPHONE HYDROCHLORIDE 2 MG: 2 TABLET ORAL at 21:42

## 2025-04-24 RX ADMIN — KETOROLAC TROMETHAMINE 15 MG: 30 INJECTION, SOLUTION INTRAMUSCULAR at 18:10

## 2025-04-24 RX ADMIN — PROPOFOL 150 MCG/KG/MIN: 10 INJECTION, EMULSION INTRAVENOUS at 17:45

## 2025-04-24 RX ADMIN — ACETAMINOPHEN 650 MG: 325 TABLET, FILM COATED ORAL at 21:42

## 2025-04-24 RX ADMIN — ACETAMINOPHEN 975 MG: 325 TABLET ORAL at 17:35

## 2025-04-24 RX ADMIN — Medication 250 ML/HR: at 19:45

## 2025-04-24 RX ADMIN — SODIUM CHLORIDE, SODIUM LACTATE, POTASSIUM CHLORIDE, AND CALCIUM CHLORIDE: .6; .31; .03; .02 INJECTION, SOLUTION INTRAVENOUS at 17:35

## 2025-04-24 RX ADMIN — MIDAZOLAM 2 MG: 1 INJECTION INTRAMUSCULAR; INTRAVENOUS at 17:42

## 2025-04-24 RX ADMIN — FENTANYL CITRATE 25 MCG: 50 INJECTION INTRAMUSCULAR; INTRAVENOUS at 18:28

## 2025-04-24 RX ADMIN — FENTANYL CITRATE 50 MCG: 50 INJECTION INTRAMUSCULAR; INTRAVENOUS at 17:45

## 2025-04-24 RX ADMIN — FENTANYL CITRATE 25 MCG: 50 INJECTION INTRAMUSCULAR; INTRAVENOUS at 19:15

## 2025-04-24 RX ADMIN — SENNOSIDES AND DOCUSATE SODIUM 1 TABLET: 50; 8.6 TABLET ORAL at 21:42

## 2025-04-24 RX ADMIN — FENTANYL CITRATE 25 MCG: 50 INJECTION INTRAMUSCULAR; INTRAVENOUS at 18:57

## 2025-04-24 RX ADMIN — FENTANYL CITRATE 50 MCG: 50 INJECTION INTRAMUSCULAR; INTRAVENOUS at 17:55

## 2025-04-24 RX ADMIN — PROPOFOL 50 MG: 10 INJECTION, EMULSION INTRAVENOUS at 17:45

## 2025-04-24 RX ADMIN — Medication 2 G: at 17:41

## 2025-04-24 RX ADMIN — SODIUM CHLORIDE, SODIUM LACTATE, POTASSIUM CHLORIDE, AND CALCIUM CHLORIDE: .6; .31; .03; .02 INJECTION, SOLUTION INTRAVENOUS at 21:44

## 2025-04-24 RX ADMIN — DEXAMETHASONE SODIUM PHOSPHATE 4 MG: 4 INJECTION, SOLUTION INTRA-ARTICULAR; INTRALESIONAL; INTRAMUSCULAR; INTRAVENOUS; SOFT TISSUE at 17:47

## 2025-04-24 RX ADMIN — FENTANYL CITRATE 25 MCG: 50 INJECTION INTRAMUSCULAR; INTRAVENOUS at 18:43

## 2025-04-24 RX ADMIN — FENTANYL CITRATE 50 MCG: 50 INJECTION INTRAMUSCULAR; INTRAVENOUS at 19:42

## 2025-04-24 RX ADMIN — ONDANSETRON 4 MG: 2 INJECTION INTRAMUSCULAR; INTRAVENOUS at 17:47

## 2025-04-24 ASSESSMENT — ACTIVITIES OF DAILY LIVING (ADL)
ADLS_ACUITY_SCORE: 47
ADLS_ACUITY_SCORE: 24
ADLS_ACUITY_SCORE: 47
ADLS_ACUITY_SCORE: 47
ADLS_ACUITY_SCORE: 24
ADLS_ACUITY_SCORE: 47
ADLS_ACUITY_SCORE: 47

## 2025-04-24 NOTE — H&P
History and physical    NAME:Radha Graves  : 1995  MRN: [id number]  GESTATIONAL AGE: Unknown     [facility]    ADMISSION DATE: @2025    PCP:  No Ref-Primary, Physician     CHIEF COMPLAINT: Postpartum retained products    HPI:  Radha Graves is admitted for a suction D&C under ultrasound guidance for retained products of conception.  She is postpartum and reports a normal delivery on 2025.  Imaging confirms the presence of probable retained products and she is continue to have cramping and bleeding.  She has a history of breast cancer with bilateral mastectomy.     OBSTETRICAL HISTORY:       PAST MEDICAL HISTORY:  Past Medical History:   Diagnosis Date    Anemia     Breast disorder     Endometriosis 2008    Fibroadenoma of both breasts     BIRADS 4 - Suspicious. US-Guided Biopsy of the Right Breast Mass at 11:00 6cmFN on 2020 revealed benign results of fibroadenoma without atypia or malignancy. Genetic Testing with [employer]-gene panel 10/13/2020 was Negative for pathogenic mutations.    Ovarian cancer, left (H) 2010    Oopherectomy   Stage 1       PAST SURGICAL HISTORY:  Past Surgical History:   Procedure Laterality Date    MASTECTOMY, RECONSTRUCT BREAST, COMBINED Bilateral     [id number]    oopherectomy Right        SOCIAL HISTORY:  Social History     Socioeconomic History    Marital status: Single     Spouse name: Not on file    Number of children: 1    Years of education: Not on file    Highest education level: Not on file   Occupational History    Not on file   Tobacco Use    Smoking status: Never     Passive exposure: Never    Smokeless tobacco: Never   Vaping Use    Vaping status: Never Used   Substance and Sexual Activity    Alcohol use: Not Currently    Drug use: Never    Sexual activity: Yes     Partners: Male   Other Topics Concern    Not on file   Social History Narrative    Not on file     Social Drivers of Health     Financial Resource Strain: Low Risk   (3/9/2025)    Financial Resource Strain     Within the past 12 months, have you or your family members you live with been unable to get utilities (heat, electricity) when it was really needed?: No   Food Insecurity: Low Risk  (3/9/2025)    Food Insecurity     Within the past 12 months, did you worry that your food would run out before you got money to buy more?: No     Within the past 12 months, did the food you bought just not last and you didn t have money to get more?: No   Transportation Needs: Low Risk  (3/9/2025)    Transportation Needs     Within the past 12 months, has lack of transportation kept you from medical appointments, getting your medicines, non-medical meetings or appointments, work, or from getting things that you need?: No   Physical Activity: Not on file   Stress: Not on file   Social Connections: Unknown (2/6/2021)    Received from [facility]    Social Connections     Do your friends and family support you?: Not on file     What agencies support you?: Not on file   Interpersonal Safety: Low Risk  (3/27/2025)    Interpersonal Safety     Do you feel physically and emotionally safe where you currently live?: Yes     Within the past 12 months, have you been hit, slapped, kicked or otherwise physically hurt by someone?: No     Within the past 12 months, have you been humiliated or emotionally abused in other ways by your partner or ex-partner?: No   Housing Stability: Low Risk  (3/9/2025)    Housing Stability     Do you have housing? : Yes     Are you worried about losing your housing?: No       MEDICATIONS:  Current Facility-Administered Medications   Medication Dose Route Frequency Provider Last Rate Last Admin    acetaminophen (TYLENOL) tablet 975 mg  975 mg Oral Once Caro Melgoza MD        lactated ringers infusion   Intravenous Continuous Caro Melgoza MD        lidocaine (LMX4) cream   Topical Q1H PRN Caro Melgoza MD        lidocaine 1 % 0.1-1 mL  0.1-1 mL Other  Q1H PRN Caro Melgoza MD        methylergonovine (METHERGINE) 0.2 MG/ML injection             sodium chloride (PF) 0.9% PF flush 3 mL  3 mL Intracatheter Q8H Caro Melgoza MD        sodium chloride (PF) 0.9% PF flush 3 mL  3 mL Intracatheter q1 min prn Caro Melgoza MD           ALLERGIES:  Allergies   Allergen Reactions    Penicillins     Oxycodone Nausea and Vomiting     Few days after bilateral mastectomy reported onset of nausea       REVIEW OF SYSTEMS:  Negative except what is stated in the HPI    PHYSICAL EXAM :  /80   Pulse 81   Temp 98.3  F (36.8  C) (Temporal)   Wt 53.1 kg (117 lb)   SpO2 100%   Breastfeeding Unknown   BMI 20.08 kg/m     General Appearance: Alert, appropriate appearance for age. No acute distress,   HEENT: Grossly normal.,   Chest/Respiratory: Normal chest wall and respirations. Clear to auscultation.  Cardiovascular: Regular rate and rhythm  Gastrointestinal: sOFT nt  Fetal Heart Tones: no fetal heart tones noted  Vaginal Exam: uTERUS TENDER  Musculoskeletal: moving all four extremities without difficulty  Skin: no rash or abnormalities,   Neurologic: Normal gait and speech,    Psychiatric: Alert and oriented, appropriate affect.      LABS:  Lab Results   Component Value Date    HGB 12.4 04/21/2025     @LABRSLTOB(ABORH EXT,LN-ABORH,HML ABO/RH,HGB EXT,HGB,RUBELLA EXT,LN-RUBELLA IGG ANTIBODY:Last:1)@   Lab Results: personally reviewed.     IMAGING:  [unfilled]  I have reviewed the radiologists interpretation.    IMPRESSION:  Retained products with probable placental cotyledon    RECOMMENDATIONS:  Plan suction D&C under ultrasound guidance  Treatment methods were discussed with the patient, expectant management vs dilation and curettage, including risks verses benefits of both .      Juve Villegas MD       CC: No Ref-Primary, Physician, Juve Villegas MD

## 2025-04-24 NOTE — ANESTHESIA PREPROCEDURE EVALUATION
Anesthesia Pre-Procedure Evaluation    Patient: Radha Graves   MRN: [id number] : 1995        Procedure : Procedure(s):  SUCTION DILATION AND CURETTAGE WITH ULTRASOUND GUIDANCE          Past Medical History:   Diagnosis Date    Anemia     Breast disorder     Endometriosis     Fibroadenoma of both breasts     BIRADS 4 - Suspicious. US-Guided Biopsy of the Right Breast Mass at 11:00 6cmFN on 2020 revealed benign results of fibroadenoma without atypia or malignancy. Genetic Testing with [employer]-gene panel 10/13/2020 was Negative for pathogenic mutations.    Ovarian cancer, left (H) 2010    Oopherectomy   Stage 1      Past Surgical History:   Procedure Laterality Date    MASTECTOMY, RECONSTRUCT BREAST, COMBINED Bilateral     [id number]    oopherectomy Right       Allergies   Allergen Reactions    Penicillins     Oxycodone Nausea and Vomiting     Few days after bilateral mastectomy reported onset of nausea      Social History     Tobacco Use    Smoking status: Never     Passive exposure: Never    Smokeless tobacco: Never   Substance Use Topics    Alcohol use: Not Currently      Wt Readings from Last 1 Encounters:   25 53.1 kg (117 lb)        Anesthesia Evaluation   Pt has had prior anesthetic. Type: General.    History of anesthetic complications (slow to wake)  - .      ROS/MED HX  ENT/Pulmonary:     (+)                      asthma                  Neurologic:  - neg neurologic ROS     Cardiovascular:  - neg cardiovascular ROS     METS/Exercise Tolerance:     Hematologic:  - neg hematologic  ROS     Musculoskeletal:  - neg musculoskeletal ROS     GI/Hepatic:  - neg GI/hepatic ROS     Renal/Genitourinary:  - neg Renal ROS     Endo:  - neg endo ROS     Psychiatric/Substance Use:       Infectious Disease:  - neg infectious disease ROS     Malignancy:  - neg malignancy ROS     Other:            Physical Exam    Airway  airway exam normal      Mallampati: I   TM distance: > 3 FB   Neck  "ROM: full     Respiratory Devices and Support         Dental           Cardiovascular   cardiovascular exam normal          Pulmonary   pulmonary exam normal                OUTSIDE LABS:  CBC:   Lab Results   Component Value Date    WBC 10.9 04/21/2025    WBC 9.5 03/08/2025    HGB 12.4 04/21/2025    HGB 9.3 (L) 03/10/2025    HCT 38.2 04/21/2025    HCT 37.4 03/08/2025     04/21/2025     03/08/2025     BMP:   Lab Results   Component Value Date     04/21/2025    POTASSIUM 3.9 04/21/2025    CHLORIDE 106 04/21/2025    CO2 22 04/21/2025    BUN 13.2 04/21/2025    CR 0.64 04/21/2025    GLC 83 04/21/2025     COAGS: No results found for: \"PTT\", \"INR\", \"FIBR\"  POC: No results found for: \"BGM\", \"HCG\", \"HCGS\"  HEPATIC:   Lab Results   Component Value Date    ALBUMIN 4.4 04/21/2025    PROTTOTAL 7.0 04/21/2025    ALT 6 04/21/2025    AST 14 04/21/2025    ALKPHOS 67 04/21/2025    BILITOTAL 0.4 04/21/2025     OTHER:   Lab Results   Component Value Date    A1C 5.1 10/08/2024    KETURAH 9.0 04/21/2025    MAG 2.1 04/21/2025    LIPASE 21 04/21/2025    TSH 0.53 04/21/2025       Anesthesia Plan    ASA Status:  3       Anesthesia Type: MAC.              Consents    Anesthesia Plan(s) and associated risks, benefits, and realistic alternatives discussed. Questions answered and patient/representative(s) expressed understanding.     - Discussed: Risks, Benefits and Alternatives for BOTH SEDATION and the PROCEDURE were discussed     - Discussed with:  Patient      - Extended Intubation/Ventilatory Support Discussed: No.      - Patient is DNR/DNI Status: No     Use of blood products discussed: No .     Postoperative Care    Pain management: IV analgesics, Multi-modal analgesia.   PONV prophylaxis: Ondansetron (or other 5HT-3)     Comments:    Other Comments: Retained products from labor, delivered last month.           Caro Melgoza MD    Clinically Significant Risk Factors Present on Admission                                 # " Asthma: noted on problem list

## 2025-04-24 NOTE — PROGRESS NOTES
SUBJECTIVE:                                                   Radha Graves is a 29 year old who presents to clinic today for the following health issue(s):  Patient presents with:  Follow Up      HPI:  Pt with history of bleeding postpartum with possible retained products of conception, noted on transabdominal US on . She continues to have bleeding without improvement from . She is changing a pad every hour (even through the night), sometimes with 'gushes'.  Having some dizziness and headaches. Reports cramping pain and is not able to hold baby.  Hgb 12.4. She was sent home from the ED with Ibuprofen and Zofran. She is taking the Ibuprofen without relief.    .  Using oral contraceptives for contraception- started at 2 weeks postpartum.      Problem list and histories reviewed & adjusted, as indicated.  Additional history: as documented.    Patient Active Problem List   Diagnosis    Bilateral fibroadenomas of breasts    Endometriosis    Family history of breast cancer    History of bilateral mastectomy    History of ovarian cancer    Underweight    Seizure-like activity (H)    Childhood asthma without complication     Past Surgical History:   Procedure Laterality Date    MASTECTOMY, RECONSTRUCT BREAST, COMBINED Bilateral     [id number]    oopherectomy Right 2010      Social History     Tobacco Use    Smoking status: Never     Passive exposure: Never    Smokeless tobacco: Never   Substance Use Topics    Alcohol use: Not Currently      Problem (# of Occurrences) Relation (Name,Age of Onset)    Unknown/Adopted (1) Mother    Diabetes (2) Maternal Grandmother, Maternal Grandfather    Heart Disease (2) Maternal Grandmother, Maternal Grandfather    Hypertension (2) Maternal Grandmother, Maternal Grandfather    Cerebrovascular Disease (2) Maternal Grandmother, Maternal Grandfather    Breast Cancer (2) Maternal Grandmother (26), Maternal Great-Grandmother (31)              Current Outpatient Medications  "  Medication Sig Dispense Refill    acetaminophen (TYLENOL) 325 MG tablet Take 2 tablets (650 mg) by mouth every 4 hours as needed for fever or other (second line or per patient preference for mild to moderate pain management).      albuterol (PROAIR HFA/PROVENTIL HFA/VENTOLIN HFA) 108 (90 Base) MCG/ACT inhaler Inhale 2 puffs into the lungs.      cyanocobalamin (VITAMIN B-12) 1000 MCG tablet Take 1 tablet (1,000 mcg) by mouth daily. 100 tablet 1    ferrous gluconate (FERGON) 324 (38 Fe) MG tablet Take 1 tablet (324 mg) by mouth daily (with breakfast). 100 tablet 3    ibuprofen (ADVIL/MOTRIN) 800 MG tablet Take 1 tablet (800 mg) by mouth every 6 hours as needed for other (first line or per patient preference for mild to moderate pain management).      levonorgestrel-ethinyl estradiol (AVIANE) 0.1-20 MG-MCG tablet Take 1 tablet by mouth daily. 84 tablet 3    omeprazole (PRILOSEC) 20 MG DR capsule Take 1 capsule (20 mg) by mouth daily. 30 capsule 1    ondansetron (ZOFRAN ODT) 4 MG ODT tab Take 1 tablet (4 mg) by mouth every 8 hours as needed for nausea. 10 tablet 0    psyllium (METAMUCIL/KONSYL) Packet Take 1 packet by mouth daily.      vitamin C (ASCORBIC ACID) 250 MG tablet Take 1 tablet (250 mg) by mouth daily. 100 tablet 1     No current facility-administered medications for this visit.     Allergies   Allergen Reactions    Penicillins     Oxycodone Nausea and Vomiting     Few days after bilateral mastectomy reported onset of nausea       ROS:   ROS: 10 point ROS neg other than the symptoms noted above in the HPI.     OBJECTIVE:     BP 98/60 (BP Location: Right arm, Patient Position: Chair, Cuff Size: Adult Regular)   Pulse 82   Ht 1.626 m (5' 4\")   Wt 53.3 kg (117 lb 6.4 oz)   SpO2 97%   Breastfeeding No   BMI 20.15 kg/m    Body mass index is 20.15 kg/m .    PHYSICAL EXAM:  Constitutional:  Appearance: pale and exhausted  Chest:  Respiratory Effort:  Breathing unlabored.   Cardiovascular: Regular " rate  Neurologic:  Mental Status:  Oriented X3.  Normal strength and tone, sensory exam grossly normal, mentation intact and speech normal.    Psychiatric:  Mentation appears concerned and stressed    In-Clinic Test Results:  No results found for this or any previous visit (from the past 24 hours).    ASSESSMENT/PLAN:                                                        ICD-10-CM    1. Retained products of conception, postpartum  O72.0 US Pelvic Complete with Transvaginal     Ob/Gyn  Referral          PLAN:    STAT transvaginal US - 1400 today  STAT OB Consult with [facility]. Dr. Villegas is available to see pt at 1700 today.    BENNY Oconnell CNM

## 2025-04-24 NOTE — PROGRESS NOTES
SUBJECTIVE:                                                   Radha Graves is a 29 year old who presents to clinic today for the following health issue(s):  Patient presents with:  Follow Up      HPI:  Pt with history of bleeding postpartum with possible retained products of conception, noted on transabdominal US on . She continues to have bleeding without improvement from . She is changing a pad every hour (even through the night), sometimes with 'gushes'.  Having some dizziness and headaches. Reports cramping pain and is not able to hold baby.  Hgb 12.4. She was sent home from the ED with Ibuprofen and Zofran. She is taking the Ibuprofen without relief.    .  Using oral contraceptives for contraception- started at 2 weeks postpartum.      Problem list and histories reviewed & adjusted, as indicated.  Additional history: as documented.    Patient Active Problem List   Diagnosis    Bilateral fibroadenomas of breasts    Endometriosis    Family history of breast cancer    History of bilateral mastectomy    History of ovarian cancer    Underweight    Seizure-like activity (H)    Childhood asthma without complication     Past Surgical History:   Procedure Laterality Date    MASTECTOMY, RECONSTRUCT BREAST, COMBINED Bilateral     [id number]    oopherectomy Right 2010      Social History     Tobacco Use    Smoking status: Never     Passive exposure: Never    Smokeless tobacco: Never   Substance Use Topics    Alcohol use: Not Currently      Problem (# of Occurrences) Relation (Name,Age of Onset)    Unknown/Adopted (1) Mother    Diabetes (2) Maternal Grandmother, Maternal Grandfather    Heart Disease (2) Maternal Grandmother, Maternal Grandfather    Hypertension (2) Maternal Grandmother, Maternal Grandfather    Cerebrovascular Disease (2) Maternal Grandmother, Maternal Grandfather    Breast Cancer (2) Maternal Grandmother (26), Maternal Great-Grandmother (31)              Current Outpatient Medications  "  Medication Sig Dispense Refill    acetaminophen (TYLENOL) 325 MG tablet Take 2 tablets (650 mg) by mouth every 4 hours as needed for fever or other (second line or per patient preference for mild to moderate pain management).      albuterol (PROAIR HFA/PROVENTIL HFA/VENTOLIN HFA) 108 (90 Base) MCG/ACT inhaler Inhale 2 puffs into the lungs.      cyanocobalamin (VITAMIN B-12) 1000 MCG tablet Take 1 tablet (1,000 mcg) by mouth daily. 100 tablet 1    ferrous gluconate (FERGON) 324 (38 Fe) MG tablet Take 1 tablet (324 mg) by mouth daily (with breakfast). 100 tablet 3    ibuprofen (ADVIL/MOTRIN) 800 MG tablet Take 1 tablet (800 mg) by mouth every 6 hours as needed for other (first line or per patient preference for mild to moderate pain management).      levonorgestrel-ethinyl estradiol (AVIANE) 0.1-20 MG-MCG tablet Take 1 tablet by mouth daily. 84 tablet 3    omeprazole (PRILOSEC) 20 MG DR capsule Take 1 capsule (20 mg) by mouth daily. 30 capsule 1    ondansetron (ZOFRAN ODT) 4 MG ODT tab Take 1 tablet (4 mg) by mouth every 8 hours as needed for nausea. 10 tablet 0    psyllium (METAMUCIL/KONSYL) Packet Take 1 packet by mouth daily.      vitamin C (ASCORBIC ACID) 250 MG tablet Take 1 tablet (250 mg) by mouth daily. 100 tablet 1     No current facility-administered medications for this visit.     Allergies   Allergen Reactions    Penicillins     Oxycodone Nausea and Vomiting     Few days after bilateral mastectomy reported onset of nausea       ROS:   ROS: 10 point ROS neg other than the symptoms noted above in the HPI.     OBJECTIVE:     BP 98/60 (BP Location: Right arm, Patient Position: Chair, Cuff Size: Adult Regular)   Pulse 82   Ht 1.626 m (5' 4\")   Wt 53.3 kg (117 lb 6.4 oz)   SpO2 97%   Breastfeeding No   BMI 20.15 kg/m    Body mass index is 20.15 kg/m .    PHYSICAL EXAM:  Constitutional:  Appearance: pale and exhausted  Chest:  Respiratory Effort:  Breathing unlabored.   Cardiovascular: Regular " rate  Neurologic:  Mental Status:  Oriented X3.  Normal strength and tone, sensory exam grossly normal, mentation intact and speech normal.    Psychiatric:  Mentation appears concerned and stressed    In-Clinic Test Results:  No results found for this or any previous visit (from the past 24 hours).    ASSESSMENT/PLAN:                                                        ICD-10-CM    1. Retained products of conception, postpartum  O72.0 US Pelvic Complete with Transvaginal     Ob/Gyn  Referral          PLAN:    STAT transvaginal US - 1400 today  STAT OB Consult with [facility]. Dr. Villegas is available to see pt at 1700 today.    BENNY Oconnell CNM

## 2025-04-24 NOTE — OP NOTE
PROCEDURE NOTE - SUCTION D & C    NAME: Radha Graves  : 1995  MRN: [id number]     DATE OF SERVICE: 2025     PREOPERATIVE DIAGNOSIS: Retained Products    POSTOPERATIVE DIAGNOSIS: Same    PROCEDURE: Suction dilatation and curettage under ultrasound guidance    SURGEON: Juve Villegas MD    ANESTHESIA: MAC    ESTIMATED BLOOD LOSS: 30CC    SPECIMENS: Uterine contents, sent to pathology    COMPLICATIONS: None.     HISTORY OF PRESENT ILLNESS:  This is a 29 year old female with a known missed . The risks, benefits and alternatives to the procedure were discussed with her at length.  She expressed understanding and wished to proceed.     PROCEDURE NOTE:  Patient was brought to the operating room and after induction of anesthesia was prepped and draped in the dorsal lithotomy position.  A time out was called and the patient and the procedure were verified.  A bimanual exam was done.  Uterus was noted to be 12 weeks gestation. The bladder was drained of urine.  A sterile speculum was placed.  The anterior lip of the cervix was grasped with a single tooth tenaculum.  Edith cervical dilators were used to dilate the cervix.  A # 8 suction curette was induced and rotated to clear the uterus of all products of conception.  A sharp curette was then introduced. Once it was felt that all tissue was removed from all quadrants a decision was made to terminate the procedure. Transabdominal ultrasound was used throughout to ensure safety and completeness.Sponge and instrument counts were correct. Patient tolerated the procedure well and was taken to the recovery room in good condition.      Juve Villegas MD      CC: No Ref-Primary, Physician, Juve Villegas MD

## 2025-04-24 NOTE — ANESTHESIA CARE TRANSFER NOTE
Patient: Radha DUCKWORTH Colon    Procedure: Procedure(s):  SUCTION DILATION AND CURETTAGE WITH ULTRASOUND GUIDANCE       Diagnosis: Third-stage hemorrhage [O72.0]  Diagnosis Additional Information: No value filed.    Anesthesia Type:   MAC     Note:    Oropharynx: spontaneously breathing and oropharynx clear of all foreign objects  Level of Consciousness: awake  Oxygen Supplementation: face mask  Level of Supplemental Oxygen (L/min / FiO2): 6  Independent Airway: airway patency satisfactory and stable  Dentition: dentition unchanged  Vital Signs Stable: post-procedure vital signs reviewed and stable  Report to RN Given: handoff report given  Patient transferred to: Phase II    Handoff Report: Identifed the Patient, Identified the Reponsible Provider, Reviewed the pertinent medical history, Discussed the surgical course, Reviewed Intra-OP anesthesia mangement and issues during anesthesia, Set expectations for post-procedure period and Allowed opportunity for questions and acknowledgement of understanding      Vitals:  Vitals Value Taken Time   /75 04/24/25 1820   Temp     Pulse 72 04/24/25 1822   Resp     SpO2 100 % 04/24/25 1822   Vitals shown include unfiled device data.    Electronically Signed By: BENNY Beckham CRNA  April 24, 2025  6:23 PM

## 2025-04-25 VITALS
OXYGEN SATURATION: 99 % | SYSTOLIC BLOOD PRESSURE: 102 MMHG | BODY MASS INDEX: 19.53 KG/M2 | RESPIRATION RATE: 16 BRPM | WEIGHT: 113.76 LBS | DIASTOLIC BLOOD PRESSURE: 60 MMHG | HEART RATE: 56 BPM | TEMPERATURE: 98.5 F

## 2025-04-25 LAB
GLUCOSE BLDC GLUCOMTR-MCNC: 109 MG/DL (ref 70–99)
HGB BLD-MCNC: 11.1 G/DL (ref 11.7–15.7)

## 2025-04-25 PROCEDURE — 36415 COLL VENOUS BLD VENIPUNCTURE: CPT

## 2025-04-25 PROCEDURE — 82962 GLUCOSE BLOOD TEST: CPT

## 2025-04-25 PROCEDURE — 250N000013 HC RX MED GY IP 250 OP 250 PS 637: Performed by: OBSTETRICS & GYNECOLOGY

## 2025-04-25 PROCEDURE — 85018 HEMOGLOBIN: CPT

## 2025-04-25 RX ORDER — METHYLERGONOVINE MALEATE 0.2 MG/1
0.2 TABLET ORAL EVERY 6 HOURS
Qty: 12 TABLET | Refills: 0 | Status: SHIPPED | OUTPATIENT
Start: 2025-04-25 | End: 2025-04-28

## 2025-04-25 RX ADMIN — IBUPROFEN 800 MG: 800 TABLET, FILM COATED ORAL at 04:15

## 2025-04-25 RX ADMIN — ACETAMINOPHEN 975 MG: 325 TABLET, FILM COATED ORAL at 04:16

## 2025-04-25 ASSESSMENT — ACTIVITIES OF DAILY LIVING (ADL)
ADLS_ACUITY_SCORE: 25
ADLS_ACUITY_SCORE: 24
ADLS_ACUITY_SCORE: 25

## 2025-04-25 NOTE — PROVIDER NOTIFICATION
Patient passed baseball size clot and soaked jud pad.  notified.  Pitocin ordered and hemoglobin ordered. Patient will stay overnight for observation and pain control. Family updated

## 2025-04-25 NOTE — PROGRESS NOTES
Gynecology Progress Note         Assessment and Plan:    Assessment: Radha Graves is a 29 year old who is Post-operative day #1 status post Dilation and curettage. Doing well.  No immediate surgical complications identified.  No excessive bleeding  Pain well-controlled.     Plan:   Ambulate  Hemoglobin this morning, stable at 11.1  Advance activity as tolerated  Pain control measures: PO meds  Advance diet as tolerated  Methergine PO at discharge x 3 days  Discharge today           Interval History:   Doing well.  Continues to improve.  Pain is now well-controlled.  No fevers.  Bleeding is light with occasional small clots when up to the bathroom    Radha delivered a viable female via  on 3/9/2025 with the [facility] midwives at [facility]. On 25 her postpartum bleeding had stopped. It restarted again on 25 and was bright red. She also began experiencing severe abdominal pain and cramping. She was seen in the office on 25 for her 6 week postpartum visit and was pale and visibly in pain and unwell per CNM note. Stat TVUS was obtained that indicated retained POC. She was admitted to [facility] for a dilation and curretage with Dr. Villegas on 25.     Procedure was uncomplicated. She experienced an episode of bleeding and passing of a clot post operatively. Pain was initially difficult to control with oral meds, so she was admitted overnight.           Significant Problems:      Past Medical History:   Diagnosis Date    Anemia     Breast disorder     Endometriosis     Fibroadenoma of both breasts     BIRADS 4 - Suspicious. US-Guided Biopsy of the Right Breast Mass at 11:00 6cmFN on 2020 revealed benign results of fibroadenoma without atypia or malignancy. Genetic Testing with [employer] [facility] 84-gene panel 10/13/2020 was Negative for pathogenic mutations.    Ovarian cancer, left (H) 2010    Oopherectomy   Stage 1               Medications:     Current Facility-Administered Medications    Medication Dose Route Frequency Provider Last Rate Last Admin    acetaminophen (TYLENOL) tablet 650 mg  650 mg Oral Q4H PRN Juve Villegas MD   650 mg at 04/24/25 2142    acetaminophen (TYLENOL) tablet 975 mg  975 mg Oral Q6H Juve Villegas MD   975 mg at 04/25/25 0416    Followed by    [START ON 4/28/2025] acetaminophen (TYLENOL) tablet 650 mg  650 mg Oral Q6H Juve Villegas MD        albuterol (PROVENTIL HFA/VENTOLIN HFA) inhaler  2 puff Inhalation Q4H PRN Juve Villegas MD        benzocaine-menthol (CEPACOL) 15-3.6 MG lozenge 1 lozenge  1 lozenge Buccal Q1H PRN Juve Villegas MD        bisacodyl (DULCOLAX) suppository 10 mg  10 mg Rectal Daily PRN Juve Villegas MD        HYDROmorphone (DILAUDID) injection 0.2 mg  0.2 mg Intravenous Q2H PRN Juve Villegas MD        Or    HYDROmorphone (DILAUDID) injection 0.4 mg  0.4 mg Intravenous Q2H PRN Juve Villegas MD        HYDROmorphone (DILAUDID) tablet 2 mg  2 mg Oral Q4H PRN Juve Villegas MD   2 mg at 04/24/25 2142    Or    HYDROmorphone (DILAUDID) tablet 4 mg  4 mg Oral Q4H PRN Juve Villegas MD        hydrOXYzine HCl (ATARAX) tablet 25 mg  25 mg Oral Q6H PRN Juve Villegas MD        ketorolac (TORADOL) injection 15 mg  15 mg Intravenous Q6H Juve Villegas MD        Or    ibuprofen (ADVIL/MOTRIN) tablet 800 mg  800 mg Oral Q6H Juve Villegas MD   800 mg at 04/25/25 0415    lactated ringers infusion   Intravenous Continuous Juve Villegas MD   Stopped at 04/24/25 2144    lactated ringers infusion   Intravenous Continuous Juve Villegas MD   Stopped at 04/25/25 0420    lidocaine (LMX4) cream   Topical Q1H PRN Juve Villegas MD        lidocaine (LMX4) cream   Topical Q1H PRN Juve Villegas MD        lidocaine 1 % 0.1-1 mL  0.1-1 mL Other Q1H PRN Juve Villegas MD         lidocaine 1 % 0.1-1 mL  0.1-1 mL Other Q1H PRN Juve Villegas MD        magnesium hydroxide (MILK OF MAGNESIA) suspension 30 mL  30 mL Oral Daily PRN Juve Villegas MD        naloxone (NARCAN) injection 0.2 mg  0.2 mg Intravenous Q2 Min PRN Juve Villegas MD        Or    naloxone (NARCAN) injection 0.4 mg  0.4 mg Intravenous Q2 Min PRN Juve Villegas MD        Or    naloxone (NARCAN) injection 0.2 mg  0.2 mg Intramuscular Q2 Min PRN Juve Villegas MD        Or    naloxone (NARCAN) injection 0.4 mg  0.4 mg Intramuscular Q2 Min PRN Juve Villegas MD        ondansetron (ZOFRAN ODT) ODT tab 4 mg  4 mg Oral Q6H PRN Juve Villegas MD        Or    ondansetron (ZOFRAN) injection 4 mg  4 mg Intravenous Q6H PRN Juve Villegas MD        oxytocin (PITOCIN) 30 units in 500 mL 0.9% NaCl infusion  250 mL/hr Intravenous Continuous Juve Villegas  mL/hr at 04/24/25 1945 250 mL/hr at 04/24/25 1945    polyethylene glycol (MIRALAX) Packet 17 g  17 g Oral Daily PRN Juve Villegas MD        prochlorperazine (COMPAZINE) injection 10 mg  10 mg Intravenous Q6H PRN Juve Villegas MD        Or    prochlorperazine (COMPAZINE) tablet 10 mg  10 mg Oral Q6H PRN Juve Villegas MD        senna-docusate (SENOKOT-S/PERICOLACE) 8.6-50 MG per tablet 1 tablet  1 tablet Oral BID Juve Villegas MD   1 tablet at 04/24/25 3292    sodium chloride (PF) 0.9% PF flush 3 mL  3 mL Intracatheter Q8H Juve Villegas MD        sodium chloride (PF) 0.9% PF flush 3 mL  3 mL Intracatheter q1 min prn Juve Villegas MD        sodium chloride (PF) 0.9% PF flush 3 mL  3 mL Intracatheter Q8H [facility] Juve Villegas MD   3 mL at 04/25/25 4771    sodium chloride (PF) 0.9% PF flush 3 mL  3 mL Intracatheter q1 min prn Juve Villegas MD        sodium phosphate (FLEET ENEMA) 1 enema  1 enema  Rectal Once [place] Villegas, Juve Cortez MD                 Physical Exam:   Vitals were reviewed  Patient Vitals for the past 12 hrs:   BP Temp Temp src Pulse Resp SpO2 Weight   04/25/25 0415 100/63 98.3  F (36.8  C) Oral 57 14 99 % --   04/25/25 0025 102/56 -- -- 55 16 97 % --   04/24/25 2325 99/60 -- -- 55 18 98 % --   04/24/25 2225 96/55 97.9  F (36.6  C) Oral 73 15 98 % --   04/24/25 2155 109/65 98.2  F (36.8  C) Oral 65 16 98 % --   04/24/25 2125 109/69 98.2  F (36.8  C) Oral 68 16 97 % --   04/24/25 2115 -- -- -- -- -- -- 51.6 kg (113 lb 12.1 oz)   04/24/25 2050 108/73 -- -- 65 -- -- --   04/24/25 2040 109/74 -- -- 55 -- 98 % --   04/24/25 2030 116/72 -- -- 66 -- -- --   04/24/25 2020 107/66 -- -- 59 -- 97 % --   04/24/25 2010 107/61 -- -- 62 -- -- --   04/24/25 2000 117/72 -- -- 69 -- 99 % --   04/24/25 1950 109/67 -- -- 73 -- 97 % --   04/24/25 1942 -- -- -- 62 -- -- --     I/O last 3 completed shifts:  In: 1290 [I.V.:1290]  Out: 1300 [Urine:1300]    A and O x 3  HRR  Lungs CTA  Active BS x 4 quadrants  Abdomen soft and non-tender  Uterus non-tender  Lochia light           Data:   All laboratory data related to this surgery reviewed  Hemoglobin   Date Value Ref Range Status   04/24/2025 11.2 (L) 11.7 - 15.7 g/dL Final   04/21/2025 12.4 11.7 - 15.7 g/dL Final   03/10/2025 9.3 (L) 11.7 - 15.7 g/dL Final   03/08/2025 12.5 11.7 - 15.7 g/dL Final   11/12/2024 11.2 (L) 11.7 - 15.7 g/dL Final         Jessica Moritz, APRN CNP

## 2025-04-25 NOTE — PLAN OF CARE
VSS. Light to moderate bleeding, passed a few small sized clots.Given oral Dilaudid, Tylenol and Ibuprofen for pain. Passing flatus, advanced to regular diet and tolerating regular diet. IV LR saline locked at 0420. Ambulating and voiding.       Problem: Adult Inpatient Plan of Care  Goal: Optimal Comfort and Wellbeing  Outcome: Progressing  Intervention: Monitor Pain and Promote Comfort  Recent Flowsheet Documentation  Taken 4/25/2025 0416 by Alina Read, RN  Pain Management Interventions: medication (see MAR)  Taken 4/24/2025 2142 by Alina Read, RN  Pain Management Interventions: medication (see MAR)     Problem: Adult Inpatient Plan of Care  Goal: Readiness for Transition of Care  Outcome: Progressing  Intervention: Mutually Develop Transition Plan  Recent Flowsheet Documentation  Taken 4/24/2025 2148 by Alina Read, KENY  Equipment Currently Used at Home: none     Problem: Anemia  Goal: Anemia Symptom Improvement  Outcome: Progressing  Intervention: Monitor and Manage Anemia  Recent Flowsheet Documentation  Taken 4/25/2025 0025 by Alina Read, RN  Safety Promotion/Fall Prevention:   activity supervised   clutter free environment maintained   nonskid shoes/slippers when out of bed   patient and family education   room near nurse's station   room organization consistent   safety round/check completed  Taken 4/24/2025 2142 by Alina Read, RN  Safety Promotion/Fall Prevention:   activity supervised   clutter free environment maintained   nonskid shoes/slippers when out of bed   patient and family education   room near nurse's station   room organization consistent   safety round/check completed

## 2025-04-25 NOTE — DISCHARGE SUMMARY
GYN Discharge Summary    Radha Graves MRN# [id number]   Age: 29 year old YOB: 1995     Date of Admission:  4/24/2025  Date of Discharge::  4/25/2025   Admitting Physician:  Juve Villegas MD  Discharge Provider:  Jessica Moritz, APRN CNP     Home clinic: [facility]          Admission Diagnoses:   Retained products          Discharge Diagnosis:   Retained products   S/P dilation and curettage           Procedures:     Procedure(s):  Suction dilatation and curettage under ultrasound guidance                Medications Prior to Admission:     Medications Prior to Admission   Medication Sig Dispense Refill Last Dose/Taking    acetaminophen (TYLENOL) 325 MG tablet Take 2 tablets (650 mg) by mouth every 4 hours as needed for fever or other (second line or per patient preference for mild to moderate pain management).       albuterol (PROAIR HFA/PROVENTIL HFA/VENTOLIN HFA) 108 (90 Base) MCG/ACT inhaler Inhale 2 puffs into the lungs.       cyanocobalamin (VITAMIN B-12) 1000 MCG tablet Take 1 tablet (1,000 mcg) by mouth daily. 100 tablet 1     ferrous gluconate (FERGON) 324 (38 Fe) MG tablet Take 1 tablet (324 mg) by mouth daily (with breakfast). 100 tablet 3     ibuprofen (ADVIL/MOTRIN) 800 MG tablet Take 1 tablet (800 mg) by mouth every 6 hours as needed for other (first line or per patient preference for mild to moderate pain management).       levonorgestrel-ethinyl estradiol (AVIANE) 0.1-20 MG-MCG tablet Take 1 tablet by mouth daily. 84 tablet 3     omeprazole (PRILOSEC) 20 MG DR capsule Take 1 capsule (20 mg) by mouth daily. 30 capsule 1     ondansetron (ZOFRAN ODT) 4 MG ODT tab Take 1 tablet (4 mg) by mouth every 8 hours as needed for nausea. 10 tablet 0     psyllium (METAMUCIL/KONSYL) Packet Take 1 packet by mouth daily.       vitamin C (ASCORBIC ACID) 250 MG tablet Take 1 tablet (250 mg) by mouth daily. 100 tablet 1              Discharge Medications:      Current Discharge Medication List        START taking these medications    Details   !! acetaminophen (TYLENOL) 325 MG tablet Take 1-2 tablets (325-650 mg) by mouth every 4 hours as needed for mild pain.  Qty: 12 tablet, Refills: 0    Associated Diagnoses: Retained products of conception after delivery with complications      methylergonovine (METHERGINE) 0.2 MG tablet Take 1 tablet (200 mcg) by mouth every 6 hours for 3 days.  Qty: 12 tablet, Refills: 0    Associated Diagnoses: Retained products of conception after delivery with complications       !! - Potential duplicate medications found. Please discuss with provider.        CONTINUE these medications which have NOT CHANGED    Details   !! acetaminophen (TYLENOL) 325 MG tablet Take 2 tablets (650 mg) by mouth every 4 hours as needed for fever or other (second line or per patient preference for mild to moderate pain management).    Associated Diagnoses:  (normal spontaneous vaginal delivery)      albuterol (PROAIR HFA/PROVENTIL HFA/VENTOLIN HFA) 108 (90 Base) MCG/ACT inhaler Inhale 2 puffs into the lungs.      cyanocobalamin (VITAMIN B-12) 1000 MCG tablet Take 1 tablet (1,000 mcg) by mouth daily.  Qty: 100 tablet, Refills: 1    Associated Diagnoses: Iron deficiency anemia secondary to inadequate dietary iron intake      ferrous gluconate (FERGON) 324 (38 Fe) MG tablet Take 1 tablet (324 mg) by mouth daily (with breakfast).  Qty: 100 tablet, Refills: 3    Associated Diagnoses: Iron deficiency anemia secondary to inadequate dietary iron intake      ibuprofen (ADVIL/MOTRIN) 800 MG tablet Take 1 tablet (800 mg) by mouth every 6 hours as needed for other (first line or per patient preference for mild to moderate pain management).    Associated Diagnoses:  (normal spontaneous vaginal delivery)      levonorgestrel-ethinyl estradiol (AVIANE) 0.1-20 MG-MCG tablet Take 1 tablet by mouth daily.  Qty: 84 tablet, Refills: 3    Associated Diagnoses: BCP (birth  control pills) initiation      omeprazole (PRILOSEC) 20 MG DR capsule Take 1 capsule (20 mg) by mouth daily.  Qty: 30 capsule, Refills: 1    Associated Diagnoses: Gastroesophageal reflux disease with esophagitis without hemorrhage; Encounter for supervision of normal first pregnancy in second trimester      ondansetron (ZOFRAN ODT) 4 MG ODT tab Take 1 tablet (4 mg) by mouth every 8 hours as needed for nausea.  Qty: 10 tablet, Refills: 0      psyllium (METAMUCIL/KONSYL) Packet Take 1 packet by mouth daily.    Associated Diagnoses:  (normal spontaneous vaginal delivery)      vitamin C (ASCORBIC ACID) 250 MG tablet Take 1 tablet (250 mg) by mouth daily.  Qty: 100 tablet, Refills: 1    Associated Diagnoses: Iron deficiency anemia secondary to inadequate dietary iron intake       !! - Potential duplicate medications found. Please discuss with provider.                Consultations:   No consultations were requested during this admission          Brief History of Illness:   Radha delivered a viable female via  on 3/9/2025 with the [facility] midwives at [facility]. On 25 her postpartum bleeding had stopped. It restarted again on 25 and was bright red. She also began experiencing severe abdominal pain and cramping. She was seen in the office on 25 for her 6 week postpartum visit and was pale and visibly in pain and unwell per CNM note. Stat TVUS was obtained that indicated retained POC. She was admitted to [facility] for a dilation and curretage with Dr. Villegas on 25.      Procedure was uncomplicated. She experienced an episode of bleeding and passing of a clot post operatively. Pain was initially difficult to control with oral meds, so she was admitted overnight. Bleeding is light with continued passage of small clots.            Hospital Course:   The patient's hospital course was unremarkable.  She recovered as anticipated and experienced no post-operative complications. She will go home on oral  methergine x 3 days for bleeding management.           Discharge Instructions and Follow-Up:     Discharge diet: Regular   Discharge activity: Pelvic rest: abstain from intercourse and do not use tampons until bleeding resolved    Discharge follow-up: Follow up with Dr. Villegas within 1 week               Discharge Disposition:     Discharged to home      Attestation:  I have reviewed today's vital signs, notes, medications, labs and imaging.  Amount of time performed on this discharge summary: 10 minutes.  Total time: 20 minutes    Jessica Moritz, APRN CNP

## 2025-04-25 NOTE — PLAN OF CARE
Problem: Adult Inpatient Plan of Care  Goal: Plan of Care Review  Description: The Plan of Care Review/Shift note should be completed every shift.  The Outcome Evaluation is a brief statement about your assessment that the patient is improving, declining, or no change.  This information will be displayed automatically on your shiftnote.  Outcome: Met  Flowsheets (Taken 4/25/2025 0908)  Plan of Care Reviewed With: patient     Problem: Adult Inpatient Plan of Care  Goal: Optimal Comfort and Wellbeing  Outcome: Met     Problem: Anemia  Goal: Anemia Symptom Improvement  Outcome: Met     Goal Outcome Evaluation:      Plan of Care Reviewed With: patient

## 2025-04-25 NOTE — ANESTHESIA POSTPROCEDURE EVALUATION
Patient: Radha DUCKWORTH Colon    Procedure: Procedure(s):  SUCTION DILATION AND CURETTAGE WITH ULTRASOUND GUIDANCE       Anesthesia Type:  MAC    Note:  Disposition: Outpatient   Postop Pain Control: Uneventful            Sign Out: ONGOING pain issues   PONV: No   Neuro/Psych: Uneventful            Sign Out: Acceptable/Baseline neuro status   Airway/Respiratory: Uneventful            Sign Out: Acceptable/Baseline resp. status   CV/Hemodynamics: Uneventful            Sign Out: Acceptable CV status; No obvious hypovolemia; No obvious fluid overload   Other NRE: NONE   DID A NON-ROUTINE EVENT OCCUR? No           Last vitals:  Vitals Value Taken Time   /67 04/24/25 1950   Temp 36.3  C (97.4  F) 04/24/25 1820   Pulse 66 04/24/25 1958   Resp     SpO2 97 % 04/24/25 1958   Vitals shown include unfiled device data.    Electronically Signed By: Caro Melgoza MD  April 24, 2025  7:59 PM

## 2025-04-29 LAB
PATH REPORT.COMMENTS IMP SPEC: NORMAL
PATH REPORT.COMMENTS IMP SPEC: NORMAL
PATH REPORT.FINAL DX SPEC: NORMAL
PATH REPORT.GROSS SPEC: NORMAL
PATH REPORT.MICROSCOPIC SPEC OTHER STN: NORMAL
PATH REPORT.RELEVANT HX SPEC: NORMAL
PHOTO IMAGE: NORMAL

## 2025-05-12 ENCOUNTER — MEDICAL CORRESPONDENCE (OUTPATIENT)
Dept: HEALTH INFORMATION MANAGEMENT | Facility: CLINIC | Age: 30
End: 2025-05-12
Payer: COMMERCIAL

## 2025-07-10 ENCOUNTER — MEDICAL CORRESPONDENCE (OUTPATIENT)
Dept: HEALTH INFORMATION MANAGEMENT | Facility: CLINIC | Age: 30
End: 2025-07-10
Payer: COMMERCIAL

## (undated) DEVICE — BRIEF STRETCH XL MPS40

## (undated) DEVICE — SUCTION CANNULA UTERINE 07MM CVD 022107-10

## (undated) DEVICE — GOWN XXL 9575

## (undated) DEVICE — CUSTOM PACK PERI GYN SMA5BPGHEA

## (undated) DEVICE — PREP POVIDONE-IODINE 10% SOLUTION 4OZ BOTTLE MDS093944

## (undated) DEVICE — SOL WATER IRRIG 1000ML BOTTLE 2F7114

## (undated) DEVICE — SOL NACL 0.9% IRRIG 1000ML BOTTLE 2F7124

## (undated) DEVICE — TUBING VACUUM COLLECTION 6FT 23116

## (undated) DEVICE — PACK MINOR SINGLE BASIN SSK3001

## (undated) DEVICE — DRSG TELFA 3X4" 1050

## (undated) DEVICE — PREP POVIDONE-IODINE 7.5% SCRUB 4OZ BOTTLE MDS093945

## (undated) DEVICE — MAT FLOOR WATERPROOF BACKSHEET FMBP30

## (undated) DEVICE — GLOVE SURG PI ULTRA TOUCH M SZ 7-1/2 LF

## (undated) RX ORDER — LIDOCAINE HYDROCHLORIDE 10 MG/ML
INJECTION, SOLUTION EPIDURAL; INFILTRATION; INTRACAUDAL; PERINEURAL
Status: DISPENSED
Start: 2025-04-24

## (undated) RX ORDER — FENTANYL CITRATE 50 UG/ML
INJECTION, SOLUTION INTRAMUSCULAR; INTRAVENOUS
Status: DISPENSED
Start: 2025-04-24

## (undated) RX ORDER — ONDANSETRON 2 MG/ML
INJECTION INTRAMUSCULAR; INTRAVENOUS
Status: DISPENSED
Start: 2025-04-24

## (undated) RX ORDER — KETOROLAC TROMETHAMINE 30 MG/ML
INJECTION, SOLUTION INTRAMUSCULAR; INTRAVENOUS
Status: DISPENSED
Start: 2025-04-24

## (undated) RX ORDER — DEXAMETHASONE SODIUM PHOSPHATE 4 MG/ML
INJECTION, SOLUTION INTRA-ARTICULAR; INTRALESIONAL; INTRAMUSCULAR; INTRAVENOUS; SOFT TISSUE
Status: DISPENSED
Start: 2025-04-24

## (undated) RX ORDER — CEFAZOLIN SODIUM 1 G/3ML
INJECTION, POWDER, FOR SOLUTION INTRAMUSCULAR; INTRAVENOUS
Status: DISPENSED
Start: 2025-04-24

## (undated) RX ORDER — PROPOFOL 10 MG/ML
INJECTION, EMULSION INTRAVENOUS
Status: DISPENSED
Start: 2025-04-24